# Patient Record
Sex: FEMALE | Race: WHITE | NOT HISPANIC OR LATINO | Employment: OTHER
[De-identification: names, ages, dates, MRNs, and addresses within clinical notes are randomized per-mention and may not be internally consistent; named-entity substitution may affect disease eponyms.]

---

## 2017-02-27 ENCOUNTER — RECORDS - HEALTHEAST (OUTPATIENT)
Dept: ADMINISTRATIVE | Facility: OTHER | Age: 57
End: 2017-02-27

## 2017-03-06 ENCOUNTER — COMMUNICATION - HEALTHEAST (OUTPATIENT)
Dept: FAMILY MEDICINE | Facility: CLINIC | Age: 57
End: 2017-03-06

## 2017-04-07 ENCOUNTER — OFFICE VISIT - HEALTHEAST (OUTPATIENT)
Dept: FAMILY MEDICINE | Facility: CLINIC | Age: 57
End: 2017-04-07

## 2017-04-07 ENCOUNTER — COMMUNICATION - HEALTHEAST (OUTPATIENT)
Dept: SCHEDULING | Facility: CLINIC | Age: 57
End: 2017-04-07

## 2017-04-07 DIAGNOSIS — R30.0 DYSURIA: ICD-10-CM

## 2017-04-07 DIAGNOSIS — Z00.00 ROUTINE GENERAL MEDICAL EXAMINATION AT A HEALTH CARE FACILITY: ICD-10-CM

## 2017-04-07 DIAGNOSIS — Z12.11 COLON CANCER SCREENING: ICD-10-CM

## 2017-04-07 DIAGNOSIS — Z12.39 BREAST CANCER SCREENING: ICD-10-CM

## 2017-04-07 DIAGNOSIS — F20.9 CHRONIC SCHIZOPHRENIA (H): ICD-10-CM

## 2017-04-07 LAB
CHOLEST SERPL-MCNC: 190 MG/DL
FASTING STATUS PATIENT QL REPORTED: YES
HBA1C MFR BLD: 5.8 % (ref 3.5–6)
HDLC SERPL-MCNC: 70 MG/DL
LDLC SERPL CALC-MCNC: 107 MG/DL
TRIGL SERPL-MCNC: 63 MG/DL

## 2017-04-07 ASSESSMENT — MIFFLIN-ST. JEOR: SCORE: 1268.64

## 2017-04-10 ENCOUNTER — COMMUNICATION - HEALTHEAST (OUTPATIENT)
Dept: FAMILY MEDICINE | Facility: CLINIC | Age: 57
End: 2017-04-10

## 2017-04-10 DIAGNOSIS — F20.9 CHRONIC SCHIZOPHRENIA (H): ICD-10-CM

## 2017-04-14 LAB
HPV INTERPRETATION - HISTORICAL: NORMAL
HPV INTERPRETER - HISTORICAL: NORMAL

## 2017-04-17 ENCOUNTER — COMMUNICATION - HEALTHEAST (OUTPATIENT)
Dept: FAMILY MEDICINE | Facility: CLINIC | Age: 57
End: 2017-04-17

## 2017-04-17 LAB
BKR LAB AP ABNORMAL BLEEDING: NO
BKR LAB AP BIRTH CONTROL/HORMONES: ABNORMAL
BKR LAB AP CERVICAL APPEARANCE: NORMAL
BKR LAB AP GYN ADEQUACY: ABNORMAL
BKR LAB AP GYN INTERPRETATION: ABNORMAL
BKR LAB AP HPV REFLEX: ABNORMAL
BKR LAB AP LMP: ABNORMAL
BKR LAB AP PATIENT STATUS: ABNORMAL
BKR LAB AP PREVIOUS ABNORMAL: ABNORMAL
BKR LAB AP PREVIOUS NORMAL: ABNORMAL
HIGH RISK?: YES
PATH REPORT.COMMENTS IMP SPEC: ABNORMAL
RESULT FLAG (HE HISTORICAL CONVERSION): ABNORMAL

## 2017-08-17 ENCOUNTER — COMMUNICATION - HEALTHEAST (OUTPATIENT)
Dept: FAMILY MEDICINE | Facility: CLINIC | Age: 57
End: 2017-08-17

## 2017-08-18 ENCOUNTER — COMMUNICATION - HEALTHEAST (OUTPATIENT)
Dept: FAMILY MEDICINE | Facility: CLINIC | Age: 57
End: 2017-08-18

## 2017-08-28 ENCOUNTER — AMBULATORY - HEALTHEAST (OUTPATIENT)
Dept: FAMILY MEDICINE | Facility: CLINIC | Age: 57
End: 2017-08-28

## 2017-08-28 DIAGNOSIS — M54.50 LOW BACK PAIN: ICD-10-CM

## 2017-08-28 DIAGNOSIS — M54.2 CERVICALGIA: ICD-10-CM

## 2017-11-16 ENCOUNTER — COMMUNICATION - HEALTHEAST (OUTPATIENT)
Dept: FAMILY MEDICINE | Facility: CLINIC | Age: 57
End: 2017-11-16

## 2017-11-17 ENCOUNTER — OFFICE VISIT - HEALTHEAST (OUTPATIENT)
Dept: FAMILY MEDICINE | Facility: CLINIC | Age: 57
End: 2017-11-17

## 2017-11-17 DIAGNOSIS — R35.0 URINARY FREQUENCY: ICD-10-CM

## 2017-11-17 ASSESSMENT — MIFFLIN-ST. JEOR: SCORE: 1268.64

## 2017-11-20 ENCOUNTER — COMMUNICATION - HEALTHEAST (OUTPATIENT)
Dept: FAMILY MEDICINE | Facility: CLINIC | Age: 57
End: 2017-11-20

## 2018-02-23 ENCOUNTER — OFFICE VISIT - HEALTHEAST (OUTPATIENT)
Dept: FAMILY MEDICINE | Facility: CLINIC | Age: 58
End: 2018-02-23

## 2018-02-23 DIAGNOSIS — R35.0 URINE FREQUENCY: ICD-10-CM

## 2018-02-23 LAB
ALBUMIN UR-MCNC: NEGATIVE MG/DL
APPEARANCE UR: CLEAR
BILIRUB UR QL STRIP: NEGATIVE
COLOR UR AUTO: YELLOW
GLUCOSE UR STRIP-MCNC: NEGATIVE MG/DL
HGB UR QL STRIP: NEGATIVE
KETONES UR STRIP-MCNC: NEGATIVE MG/DL
LEUKOCYTE ESTERASE UR QL STRIP: NEGATIVE
NITRATE UR QL: NEGATIVE
PH UR STRIP: 7 [PH] (ref 5–8)
SP GR UR STRIP: 1.01 (ref 1–1.03)
UROBILINOGEN UR STRIP-ACNC: NORMAL

## 2018-02-24 LAB — BACTERIA SPEC CULT: NO GROWTH

## 2018-02-26 ENCOUNTER — COMMUNICATION - HEALTHEAST (OUTPATIENT)
Dept: FAMILY MEDICINE | Facility: CLINIC | Age: 58
End: 2018-02-26

## 2018-06-06 ENCOUNTER — OFFICE VISIT - HEALTHEAST (OUTPATIENT)
Dept: FAMILY MEDICINE | Facility: CLINIC | Age: 58
End: 2018-06-06

## 2018-06-06 DIAGNOSIS — Z00.00 ROUTINE GENERAL MEDICAL EXAMINATION AT A HEALTH CARE FACILITY: ICD-10-CM

## 2018-06-06 DIAGNOSIS — F20.9 CHRONIC SCHIZOPHRENIA (H): ICD-10-CM

## 2018-06-06 DIAGNOSIS — Z83.3 FAMILY HISTORY OF DIABETES MELLITUS: ICD-10-CM

## 2018-06-06 DIAGNOSIS — M54.2 CERVICALGIA: ICD-10-CM

## 2018-06-06 DIAGNOSIS — M20.002 DEFORMITY OF LEFT THUMB JOINT: ICD-10-CM

## 2018-06-06 DIAGNOSIS — Z12.11 SCREEN FOR COLON CANCER: ICD-10-CM

## 2018-06-06 DIAGNOSIS — Z12.31 VISIT FOR SCREENING MAMMOGRAM: ICD-10-CM

## 2018-06-06 DIAGNOSIS — R87.610 ATYPICAL SQUAMOUS CELLS OF UNDETERMINED SIGNIFICANCE ON CYTOLOGIC SMEAR OF CERVIX (ASC-US): ICD-10-CM

## 2018-06-06 LAB
ALBUMIN SERPL-MCNC: 3.9 G/DL (ref 3.5–5)
ALP SERPL-CCNC: 77 U/L (ref 45–120)
ALT SERPL W P-5'-P-CCNC: 14 U/L (ref 0–45)
ANION GAP SERPL CALCULATED.3IONS-SCNC: 12 MMOL/L (ref 5–18)
AST SERPL W P-5'-P-CCNC: 20 U/L (ref 0–40)
BASOPHILS # BLD AUTO: 0 THOU/UL (ref 0–0.2)
BASOPHILS NFR BLD AUTO: 1 % (ref 0–2)
BILIRUB SERPL-MCNC: 0.6 MG/DL (ref 0–1)
BUN SERPL-MCNC: 14 MG/DL (ref 8–22)
CALCIUM SERPL-MCNC: 9.4 MG/DL (ref 8.5–10.5)
CHLORIDE BLD-SCNC: 103 MMOL/L (ref 98–107)
CHOLEST SERPL-MCNC: 200 MG/DL
CLUE CELLS: NORMAL
CO2 SERPL-SCNC: 26 MMOL/L (ref 22–31)
CREAT SERPL-MCNC: 0.7 MG/DL (ref 0.6–1.1)
EOSINOPHIL # BLD AUTO: 0.3 THOU/UL (ref 0–0.4)
EOSINOPHIL NFR BLD AUTO: 6 % (ref 0–6)
ERYTHROCYTE [DISTWIDTH] IN BLOOD BY AUTOMATED COUNT: 11.9 % (ref 11–14.5)
FASTING STATUS PATIENT QL REPORTED: ABNORMAL
GFR SERPL CREATININE-BSD FRML MDRD: >60 ML/MIN/1.73M2
GLUCOSE BLD-MCNC: 88 MG/DL (ref 70–125)
HCT VFR BLD AUTO: 35.9 % (ref 35–47)
HDLC SERPL-MCNC: 77 MG/DL
HGB BLD-MCNC: 12 G/DL (ref 12–16)
LDLC SERPL CALC-MCNC: 109 MG/DL
LYMPHOCYTES # BLD AUTO: 1.6 THOU/UL (ref 0.8–4.4)
LYMPHOCYTES NFR BLD AUTO: 29 % (ref 20–40)
MCH RBC QN AUTO: 31.4 PG (ref 27–34)
MCHC RBC AUTO-ENTMCNC: 33.5 G/DL (ref 32–36)
MCV RBC AUTO: 94 FL (ref 80–100)
MONOCYTES # BLD AUTO: 0.4 THOU/UL (ref 0–0.9)
MONOCYTES NFR BLD AUTO: 7 % (ref 2–10)
NEUTROPHILS # BLD AUTO: 3.3 THOU/UL (ref 2–7.7)
NEUTROPHILS NFR BLD AUTO: 59 % (ref 50–70)
PLATELET # BLD AUTO: 294 THOU/UL (ref 140–440)
PMV BLD AUTO: 7.4 FL (ref 7–10)
POTASSIUM BLD-SCNC: 4.5 MMOL/L (ref 3.5–5)
PROT SERPL-MCNC: 7.1 G/DL (ref 6–8)
RBC # BLD AUTO: 3.82 MILL/UL (ref 3.8–5.4)
SODIUM SERPL-SCNC: 141 MMOL/L (ref 136–145)
TRICHOMONAS, WET PREP: NORMAL
TRIGL SERPL-MCNC: 70 MG/DL
TSH SERPL DL<=0.005 MIU/L-ACNC: 1.96 UIU/ML (ref 0.3–5)
VALPROATE SERPL-MCNC: 43.5 UG/ML (ref 50–150)
WBC: 5.6 THOU/UL (ref 4–11)
YEAST, WET PREP: NORMAL

## 2018-06-06 ASSESSMENT — MIFFLIN-ST. JEOR: SCORE: 1260.14

## 2018-06-07 ENCOUNTER — RECORDS - HEALTHEAST (OUTPATIENT)
Dept: MAMMOGRAPHY | Facility: CLINIC | Age: 58
End: 2018-06-07

## 2018-06-07 DIAGNOSIS — Z12.31 ENCOUNTER FOR SCREENING MAMMOGRAM FOR MALIGNANT NEOPLASM OF BREAST: ICD-10-CM

## 2018-06-07 LAB
HPV SOURCE: NORMAL
HUMAN PAPILLOMA VIRUS 16 DNA: NEGATIVE
HUMAN PAPILLOMA VIRUS 18 DNA: NEGATIVE
HUMAN PAPILLOMA VIRUS FINAL DIAGNOSIS: NORMAL
HUMAN PAPILLOMA VIRUS OTHER HR: NEGATIVE
SPECIMEN DESCRIPTION: NORMAL

## 2018-06-08 ENCOUNTER — COMMUNICATION - HEALTHEAST (OUTPATIENT)
Dept: FAMILY MEDICINE | Facility: CLINIC | Age: 58
End: 2018-06-08

## 2018-06-11 ENCOUNTER — COMMUNICATION - HEALTHEAST (OUTPATIENT)
Dept: FAMILY MEDICINE | Facility: CLINIC | Age: 58
End: 2018-06-11

## 2018-06-18 ENCOUNTER — COMMUNICATION - HEALTHEAST (OUTPATIENT)
Dept: FAMILY MEDICINE | Facility: CLINIC | Age: 58
End: 2018-06-18

## 2018-06-18 LAB

## 2019-06-10 ENCOUNTER — COMMUNICATION - HEALTHEAST (OUTPATIENT)
Dept: TELEHEALTH | Facility: CLINIC | Age: 59
End: 2019-06-10

## 2019-06-10 ENCOUNTER — OFFICE VISIT - HEALTHEAST (OUTPATIENT)
Dept: FAMILY MEDICINE | Facility: CLINIC | Age: 59
End: 2019-06-10

## 2019-06-10 DIAGNOSIS — F20.9 CHRONIC SCHIZOPHRENIA (H): ICD-10-CM

## 2019-06-10 DIAGNOSIS — Z12.11 COLON CANCER SCREENING: ICD-10-CM

## 2019-06-10 DIAGNOSIS — Z00.00 ROUTINE GENERAL MEDICAL EXAMINATION AT A HEALTH CARE FACILITY: ICD-10-CM

## 2019-06-10 DIAGNOSIS — Z20.5 EXPOSURE TO HEPATITIS: ICD-10-CM

## 2019-06-10 DIAGNOSIS — R25.9 ABNORMAL INVOLUNTARY MOVEMENTS: ICD-10-CM

## 2019-06-10 DIAGNOSIS — Z79.899 HIGH RISK MEDICATIONS (NOT ANTICOAGULANTS) LONG-TERM USE: ICD-10-CM

## 2019-06-10 LAB
ALBUMIN SERPL-MCNC: 4 G/DL (ref 3.5–5)
ALP SERPL-CCNC: 77 U/L (ref 45–120)
ALT SERPL W P-5'-P-CCNC: 17 U/L (ref 0–45)
ANION GAP SERPL CALCULATED.3IONS-SCNC: 11 MMOL/L (ref 5–18)
AST SERPL W P-5'-P-CCNC: 20 U/L (ref 0–40)
BASOPHILS # BLD AUTO: 0 THOU/UL (ref 0–0.2)
BASOPHILS NFR BLD AUTO: 1 % (ref 0–2)
BILIRUB SERPL-MCNC: 0.5 MG/DL (ref 0–1)
BUN SERPL-MCNC: 14 MG/DL (ref 8–22)
CALCIUM SERPL-MCNC: 9.6 MG/DL (ref 8.5–10.5)
CHLORIDE BLD-SCNC: 108 MMOL/L (ref 98–107)
CHOLEST SERPL-MCNC: 203 MG/DL
CO2 SERPL-SCNC: 23 MMOL/L (ref 22–31)
CREAT SERPL-MCNC: 0.7 MG/DL (ref 0.6–1.1)
EOSINOPHIL # BLD AUTO: 0.4 THOU/UL (ref 0–0.4)
EOSINOPHIL NFR BLD AUTO: 8 % (ref 0–6)
ERYTHROCYTE [DISTWIDTH] IN BLOOD BY AUTOMATED COUNT: 12 % (ref 11–14.5)
FASTING STATUS PATIENT QL REPORTED: YES
GFR SERPL CREATININE-BSD FRML MDRD: >60 ML/MIN/1.73M2
GLUCOSE BLD-MCNC: 89 MG/DL (ref 70–125)
HCT VFR BLD AUTO: 38.4 % (ref 35–47)
HDLC SERPL-MCNC: 77 MG/DL
HGB BLD-MCNC: 13 G/DL (ref 12–16)
LDLC SERPL CALC-MCNC: 113 MG/DL
LYMPHOCYTES # BLD AUTO: 1.6 THOU/UL (ref 0.8–4.4)
LYMPHOCYTES NFR BLD AUTO: 32 % (ref 20–40)
MCH RBC QN AUTO: 31.6 PG (ref 27–34)
MCHC RBC AUTO-ENTMCNC: 33.9 G/DL (ref 32–36)
MCV RBC AUTO: 93 FL (ref 80–100)
MONOCYTES # BLD AUTO: 0.4 THOU/UL (ref 0–0.9)
MONOCYTES NFR BLD AUTO: 8 % (ref 2–10)
NEUTROPHILS # BLD AUTO: 2.5 THOU/UL (ref 2–7.7)
NEUTROPHILS NFR BLD AUTO: 51 % (ref 50–70)
PLATELET # BLD AUTO: 334 THOU/UL (ref 140–440)
PMV BLD AUTO: 7.3 FL (ref 7–10)
POTASSIUM BLD-SCNC: 4.2 MMOL/L (ref 3.5–5)
PROT SERPL-MCNC: 7.1 G/DL (ref 6–8)
RBC # BLD AUTO: 4.12 MILL/UL (ref 3.8–5.4)
SODIUM SERPL-SCNC: 142 MMOL/L (ref 136–145)
TRIGL SERPL-MCNC: 63 MG/DL
WBC: 4.9 THOU/UL (ref 4–11)

## 2019-06-10 ASSESSMENT — MIFFLIN-ST. JEOR: SCORE: 1252.21

## 2019-06-11 LAB
25(OH)D3 SERPL-MCNC: 31.7 NG/ML (ref 30–80)
HBV SURFACE AB SERPL IA-ACNC: NEGATIVE M[IU]/ML

## 2019-06-12 ENCOUNTER — COMMUNICATION - HEALTHEAST (OUTPATIENT)
Dept: FAMILY MEDICINE | Facility: CLINIC | Age: 59
End: 2019-06-12

## 2019-06-12 LAB
HAV IGM SERPL QL IA: NEGATIVE
HBV CORE IGM SERPL QL IA: NEGATIVE
HBV SURFACE AG SERPL QL IA: NEGATIVE
HCV AB SERPL QL IA: NEGATIVE

## 2019-07-17 ENCOUNTER — OFFICE VISIT - HEALTHEAST (OUTPATIENT)
Dept: FAMILY MEDICINE | Facility: CLINIC | Age: 59
End: 2019-07-17

## 2019-07-17 DIAGNOSIS — M20.002 DEFORMITY OF LEFT THUMB JOINT: ICD-10-CM

## 2019-07-17 DIAGNOSIS — R35.0 URINARY FREQUENCY: ICD-10-CM

## 2019-07-17 LAB
ALBUMIN UR-MCNC: NEGATIVE MG/DL
APPEARANCE UR: CLEAR
BILIRUB UR QL STRIP: NEGATIVE
COLOR UR AUTO: YELLOW
GLUCOSE UR STRIP-MCNC: NEGATIVE MG/DL
HGB UR QL STRIP: NEGATIVE
KETONES UR STRIP-MCNC: ABNORMAL MG/DL
LEUKOCYTE ESTERASE UR QL STRIP: NEGATIVE
NITRATE UR QL: NEGATIVE
PH UR STRIP: 6.5 [PH] (ref 5–8)
SP GR UR STRIP: 1.02 (ref 1–1.03)
UROBILINOGEN UR STRIP-ACNC: ABNORMAL

## 2019-07-18 LAB — BACTERIA SPEC CULT: NO GROWTH

## 2019-07-22 ENCOUNTER — OFFICE VISIT - HEALTHEAST (OUTPATIENT)
Dept: FAMILY MEDICINE | Facility: CLINIC | Age: 59
End: 2019-07-22

## 2019-07-22 ENCOUNTER — COMMUNICATION - HEALTHEAST (OUTPATIENT)
Dept: FAMILY MEDICINE | Facility: CLINIC | Age: 59
End: 2019-07-22

## 2019-07-22 DIAGNOSIS — N89.8 VAGINAL DISCHARGE: ICD-10-CM

## 2019-07-22 DIAGNOSIS — A63.0 GENITAL WARTS: ICD-10-CM

## 2019-07-22 LAB
CLUE CELLS: NORMAL
TRICHOMONAS, WET PREP: NORMAL
YEAST, WET PREP: NORMAL

## 2019-07-22 ASSESSMENT — MIFFLIN-ST. JEOR: SCORE: 1276.25

## 2019-10-09 ENCOUNTER — COMMUNICATION - HEALTHEAST (OUTPATIENT)
Dept: SCHEDULING | Facility: CLINIC | Age: 59
End: 2019-10-09

## 2019-10-09 DIAGNOSIS — R39.9 UTI SYMPTOMS: ICD-10-CM

## 2020-06-11 ENCOUNTER — COMMUNICATION - HEALTHEAST (OUTPATIENT)
Dept: FAMILY MEDICINE | Facility: CLINIC | Age: 60
End: 2020-06-11

## 2020-06-11 DIAGNOSIS — Z00.00 HEALTH CARE MAINTENANCE: ICD-10-CM

## 2020-07-07 ENCOUNTER — AMBULATORY - HEALTHEAST (OUTPATIENT)
Dept: FAMILY MEDICINE | Facility: CLINIC | Age: 60
End: 2020-07-07

## 2020-07-15 ENCOUNTER — OFFICE VISIT - HEALTHEAST (OUTPATIENT)
Dept: FAMILY MEDICINE | Facility: CLINIC | Age: 60
End: 2020-07-15

## 2020-07-15 DIAGNOSIS — Z00.00 ROUTINE GENERAL MEDICAL EXAMINATION AT A HEALTH CARE FACILITY: ICD-10-CM

## 2020-07-15 DIAGNOSIS — Z11.4 SCREENING FOR HUMAN IMMUNODEFICIENCY VIRUS WITHOUT PRESENCE OF RISK FACTORS: ICD-10-CM

## 2020-07-15 DIAGNOSIS — R73.03 PREDIABETES: ICD-10-CM

## 2020-07-15 DIAGNOSIS — L30.9 DERMATITIS: ICD-10-CM

## 2020-07-15 DIAGNOSIS — Z12.11 COLON CANCER SCREENING: ICD-10-CM

## 2020-07-15 DIAGNOSIS — Z12.31 ENCOUNTER FOR SCREENING MAMMOGRAM FOR BREAST CANCER: ICD-10-CM

## 2020-07-15 DIAGNOSIS — F31.89 ATYPICAL BIPOLAR AFFECTIVE DISORDER (H): ICD-10-CM

## 2020-07-15 LAB
HBA1C MFR BLD: 5.5 % (ref 3.5–6)
HIV 1+2 AB+HIV1 P24 AG SERPL QL IA: NEGATIVE

## 2020-07-15 ASSESSMENT — MIFFLIN-ST. JEOR: SCORE: 1266.95

## 2020-07-16 ENCOUNTER — RECORDS - HEALTHEAST (OUTPATIENT)
Dept: ADMINISTRATIVE | Facility: OTHER | Age: 60
End: 2020-07-16

## 2020-07-20 ENCOUNTER — COMMUNICATION - HEALTHEAST (OUTPATIENT)
Dept: FAMILY MEDICINE | Facility: CLINIC | Age: 60
End: 2020-07-20

## 2020-09-16 ENCOUNTER — COMMUNICATION - HEALTHEAST (OUTPATIENT)
Dept: FAMILY MEDICINE | Facility: CLINIC | Age: 60
End: 2020-09-16

## 2021-04-09 ENCOUNTER — COMMUNICATION - HEALTHEAST (OUTPATIENT)
Dept: SCHEDULING | Facility: CLINIC | Age: 61
End: 2021-04-09

## 2021-04-09 ENCOUNTER — NURSE TRIAGE (OUTPATIENT)
Dept: NURSING | Facility: CLINIC | Age: 61
End: 2021-04-09

## 2021-04-09 ENCOUNTER — OFFICE VISIT (OUTPATIENT)
Dept: URGENT CARE | Facility: URGENT CARE | Age: 61
End: 2021-04-09
Payer: MEDICAID

## 2021-04-09 VITALS
TEMPERATURE: 97.9 F | OXYGEN SATURATION: 97 % | BODY MASS INDEX: 31.01 KG/M2 | WEIGHT: 175 LBS | RESPIRATION RATE: 16 BRPM | HEART RATE: 75 BPM | SYSTOLIC BLOOD PRESSURE: 120 MMHG | HEIGHT: 63 IN | DIASTOLIC BLOOD PRESSURE: 76 MMHG

## 2021-04-09 DIAGNOSIS — R35.0 URINARY FREQUENCY: Primary | ICD-10-CM

## 2021-04-09 LAB
ALBUMIN UR-MCNC: NEGATIVE MG/DL
APPEARANCE UR: CLEAR
BACTERIA #/AREA URNS HPF: ABNORMAL /HPF
BILIRUB UR QL STRIP: NEGATIVE
COLOR UR AUTO: YELLOW
GLUCOSE UR STRIP-MCNC: NEGATIVE MG/DL
HGB UR QL STRIP: ABNORMAL
KETONES UR STRIP-MCNC: NEGATIVE MG/DL
LEUKOCYTE ESTERASE UR QL STRIP: NEGATIVE
NITRATE UR QL: NEGATIVE
NON-SQ EPI CELLS #/AREA URNS LPF: ABNORMAL /LPF
PH UR STRIP: 5.5 PH (ref 5–7)
RBC #/AREA URNS AUTO: ABNORMAL /HPF
SOURCE: ABNORMAL
SP GR UR STRIP: 1.02 (ref 1–1.03)
SPECIMEN SOURCE: NORMAL
UROBILINOGEN UR STRIP-ACNC: 0.2 EU/DL (ref 0.2–1)
WBC #/AREA URNS AUTO: ABNORMAL /HPF
WET PREP SPEC: NORMAL

## 2021-04-09 PROCEDURE — 87210 SMEAR WET MOUNT SALINE/INK: CPT | Performed by: NURSE PRACTITIONER

## 2021-04-09 PROCEDURE — 81001 URINALYSIS AUTO W/SCOPE: CPT | Performed by: PHYSICIAN ASSISTANT

## 2021-04-09 PROCEDURE — 99203 OFFICE O/P NEW LOW 30 MIN: CPT | Performed by: NURSE PRACTITIONER

## 2021-04-09 RX ORDER — RISPERIDONE 3 MG/1
TABLET ORAL
COMMUNITY
Start: 2021-03-29 | End: 2022-08-17

## 2021-04-09 RX ORDER — BENZTROPINE MESYLATE 1 MG/1
TABLET ORAL
COMMUNITY
Start: 2020-09-09 | End: 2022-10-21

## 2021-04-09 RX ORDER — ZOLPIDEM TARTRATE 5 MG/1
TABLET ORAL
COMMUNITY
Start: 2021-03-17 | End: 2022-08-17

## 2021-04-09 ASSESSMENT — MIFFLIN-ST. JEOR: SCORE: 1342.92

## 2021-04-09 NOTE — PATIENT INSTRUCTIONS
Drink extra water and is symptoms persist please see your primary care provider.    No scented products to be used around the vagina or in underwear.    Do not soak in bathtub if there is any soap in the tub.

## 2021-04-09 NOTE — PROGRESS NOTES
Chief Complaint   Patient presents with     Urgent Care     Urinary Problem     bad urinary frequency x 2 days, denies pain.          ICD-10-CM    1. Urinary frequency  R35.0 *UA reflex to Microscopic and Culture ([facility] (except [place] and [place])     Urine Microscopic     Wet prep     No obvious reason for patient's symptoms at this time.  We will culture urine as a double check for UTI but with no white blood cells this is unlikely.  Encouraged patient to drink extra water and follow-up in 1 week if symptoms still persist with her primary care provider.  If she develops fever, back pain, nausea or vomiting she will go to the emergency room.    48 minutes total time spent with patient reviewing chart, explaining potential diagnosis and plan of care completed exam and history and documentation.    Medical Decision Making    Differential Diagnosis:  UTI: UTI, Dysuria, Pyelonephritis, Kidney Stone and Vaginitis      Results for orders placed or performed in visit on 04/09/21 (from the past 24 hour(s))   *UA reflex to Microscopic and Culture ([place] and [facility] (except [place] and [place])    Specimen: Midstream Urine   Result Value Ref Range    Color Urine Yellow     Appearance Urine Clear     Glucose Urine Negative NEG^Negative mg/dL    Bilirubin Urine Negative NEG^Negative    Ketones Urine Negative NEG^Negative mg/dL    Specific Gravity Urine 1.025 1.003 - 1.035    Blood Urine Small (A) NEG^Negative    pH Urine 5.5 5.0 - 7.0 pH    Protein Albumin Urine Negative NEG^Negative mg/dL    Urobilinogen Urine 0.2 0.2 - 1.0 EU/dL    Nitrite Urine Negative NEG^Negative    Leukocyte Esterase Urine Negative NEG^Negative    Source Midstream Urine    Urine Microscopic   Result Value Ref Range    WBC Urine 0 - 5 OTO5^0 - 5 /HPF    RBC Urine 5-10 (A) OTO2^O - 2 /HPF    Squamous Epithelial /LPF Urine Few FEW^Few /LPF    Bacteria Urine Few (A) NEG^Negative /HPF       Subjective     Susi QUEZADA  "Tahir is an 60 year old female who presents to clinic today for urinary frequency for several days.  She denies dysuria, concerns about STDs, back pain, nausea, vomiting, chills, fever. She I s part of an assist at home program where she lives in her own apartment but mental health staff come and visit her and help with her medication and coordination of care. She has a staff present with her today. She is not sexually active.      Objective    /76   Pulse 75   Temp 97.9  F (36.6  C) (Oral)   Resp 16   Ht 1.6 m (5' 3\")   Wt 79.4 kg (175 lb)   SpO2 97%   BMI 31.00 kg/m      Physical Exam       GENERAL APPEARANCE: healthy appearing, alert     ABDOMEN:  soft, nontender, no HSM or masses and bowel sounds normal, no CVA tenderness  : Labia normal, vagina is atrophied, no discharge present, unable to complete speculum exam, urethra appears normal with no erythema or swelling     SKIN: no suspicious lesions or rashes     PSYCH: normal thought process; no significant mood disturbance    Patient Instructions   Drink extra water and is symptoms persist please see your primary care provider.    No scented products to be used around the vagina or in underwear.    Do not soak in bathtub if there is any soap in the tub.      HUONG Sandoval, CNP  [facility] Provider      "

## 2021-04-13 ENCOUNTER — NURSE TRIAGE (OUTPATIENT)
Dept: NURSING | Facility: CLINIC | Age: 61
End: 2021-04-13

## 2021-04-13 ENCOUNTER — OFFICE VISIT (OUTPATIENT)
Dept: URGENT CARE | Facility: URGENT CARE | Age: 61
End: 2021-04-13
Payer: MEDICAID

## 2021-04-13 VITALS
SYSTOLIC BLOOD PRESSURE: 124 MMHG | WEIGHT: 175 LBS | HEART RATE: 75 BPM | DIASTOLIC BLOOD PRESSURE: 78 MMHG | BODY MASS INDEX: 31 KG/M2 | OXYGEN SATURATION: 98 %

## 2021-04-13 DIAGNOSIS — Z00.00 LABORATORY EXAMINATION ORDERED AS PART OF A ROUTINE GENERAL MEDICAL EXAMINATION: Primary | ICD-10-CM

## 2021-04-13 PROBLEM — M20.002: Status: ACTIVE | Noted: 2019-07-17

## 2021-04-13 PROBLEM — R87.610 ATYPICAL SQUAMOUS CELLS OF UNDETERMINED SIGNIFICANCE ON CYTOLOGIC SMEAR OF CERVIX (ASC-US): Status: ACTIVE | Noted: 2018-06-06

## 2021-04-13 PROBLEM — M54.50 LOW BACK PAIN: Status: ACTIVE | Noted: 2017-08-28

## 2021-04-13 PROBLEM — F20.9 CHRONIC SCHIZOPHRENIA (H): Status: ACTIVE | Noted: 2017-04-07

## 2021-04-13 LAB — HBA1C MFR BLD: 5.6 % (ref 0–5.6)

## 2021-04-13 PROCEDURE — 99212 OFFICE O/P EST SF 10 MIN: CPT | Performed by: NURSE PRACTITIONER

## 2021-04-13 PROCEDURE — 83036 HEMOGLOBIN GLYCOSYLATED A1C: CPT | Performed by: NURSE PRACTITIONER

## 2021-04-13 PROCEDURE — 36415 COLL VENOUS BLD VENIPUNCTURE: CPT | Performed by: NURSE PRACTITIONER

## 2021-04-13 ASSESSMENT — ENCOUNTER SYMPTOMS
POLYDIPSIA: 0
PARESTHESIAS: 0
APPETITE CHANGE: 0
DIARRHEA: 0
FEVER: 0
CHILLS: 0
HEADACHES: 0
WEAKNESS: 0
FREQUENCY: 0
VOMITING: 0
DYSURIA: 0
COLOR CHANGE: 0
SLEEP DISTURBANCE: 0
LIGHT-HEADEDNESS: 0
NUMBNESS: 0
ACTIVITY CHANGE: 0
POLYPHAGIA: 0
ABDOMINAL PAIN: 0
DIAPHORESIS: 0
DIZZINESS: 0
NAUSEA: 0
TREMORS: 0
FLANK PAIN: 0
FATIGUE: 0

## 2021-04-13 NOTE — PROGRESS NOTES
Chief Complaint   Patient presents with     Urgent Care     Blood Draw     SUBJECTIVE:  Susi Haro is a 60 year old female who presents today requesting an A1C blood draw. She says she is just here today to rule out diabetes as there is a family history and she would like only an A1C drawn. She says she doesn't have a primary care provider. She denies any symptoms or concerns and says she is much better than her last clinic visit on 04/09. Denies dysuria, confusion, increased thirst, hunger, urination, upset stomach, dizziness, etc.    *The patient has bipolar schizophrenia and was by herself in the clinic room. I received a phone call from the group home RN after the visit stating she was upset why only an A1C was drawn and why the patient's group home helper was not allowed into the clinic. She requested to make a complaint against our clinic as she had a full workup written and wanted a CBC as well as her helper to come into the room. I was not made aware of this situation and thought the patient was by herself. The patient did not relay any of this information to me. I discussed with our  staff who assured me that they did not and would not turn away any helpers. It is quite standard across our urgent care clinics that we allow patient's to have helpers and [facility] no longer has a COVID-19 guest restriction. I apologized to the group home RN and relayed that I would speak to my staff, boss and she can contact patient relations for any further discussion.    No past medical history on file.  Current Outpatient Medications   Medication Sig Dispense Refill     benztropine (COGENTIN) 1 MG tablet        metFORMIN (GLUCOPHAGE) 500 MG tablet        risperiDONE (RISPERDAL) 3 MG tablet        zolpidem (AMBIEN) 5 MG tablet        Social History     Tobacco Use     Smoking status: Never Smoker     Smokeless tobacco: Never Used   Substance Use Topics     Alcohol use: Not on file     Allergies   Allergen  Reactions     Lactase      Melon      Trazodone Other (See Comments)     heart burn  heart burn, PN: heart burn       Review of Systems   Constitutional: Negative for activity change, appetite change, chills, diaphoresis, fatigue and fever.   Gastrointestinal: Negative for abdominal pain, diarrhea, nausea and vomiting.   Endocrine: Negative for polydipsia, polyphagia and polyuria.   Genitourinary: Negative for decreased urine volume, dysuria, flank pain, frequency and urgency.   Skin: Negative for color change and pallor.   Neurological: Negative for dizziness, tremors, weakness, light-headedness, numbness, headaches and paresthesias.   Psychiatric/Behavioral: Negative for mood changes and sleep disturbance.        Bipolar schizophrenia per chart review.     OBJECTIVE:  /78   Pulse 75   Wt 79.4 kg (175 lb)   SpO2 98%   BMI 31.00 kg/m       Physical Exam  Vitals signs reviewed.   Constitutional:       General: She is not in acute distress.     Appearance: She is well-developed. She is not ill-appearing, toxic-appearing or diaphoretic.      Comments: Patient is pleasant. Slow word finding speech.   HENT:      Head: Normocephalic and atraumatic.   Pulmonary:      Effort: Pulmonary effort is normal.   Abdominal:      General: Abdomen is flat.      Palpations: Abdomen is soft.   Musculoskeletal: Normal range of motion.   Skin:     General: Skin is warm and dry.      Coloration: Skin is not pale.   Neurological:      Mental Status: She is alert.      Motor: No weakness.      Coordination: Coordination normal.      Gait: Gait normal.      Comments: Bipolar schizophrenia.       Results for orders placed or performed in visit on 04/13/21   Hemoglobin A1c     Status: None   Result Value Ref Range    Hemoglobin A1C 5.6 0 - 5.6 %     ASSESSMENT:    ICD-10-CM    1. Laboratory examination ordered as part of a routine general medical examination  Z00.00 Hemoglobin A1c     PLAN:   Patient Instructions   A1C ordered per  request of patient  We will call if abnormal in the next day  Follow-up with primary care provider for any questions or concerns    Follow up with primary care provider with any problems, questions or concerns or if symptoms worsen or fail to improve. Patient agreed to plan and verbalized understanding.    Mary Beth Mora, [facility]

## 2021-04-13 NOTE — TELEPHONE ENCOUNTER
Triage Call:    -INGE Andrade calling from [facility] regarding patient who she advised be seen at [facility] today.   -INGE Andrade sent patient over due to patient increased confusion, disorganization, concerned about possible infection and per RN an A1C was the only thing checked at [facility].   -RN would like to speak directly to [facility] provider who patient saw today PHILIP Mora  -RN called over to [facility] to get directed to provider who saw patient today.   -Staff Member at [facility] RN/writer spoke with and connected provider at [facility] to INGE Andrade.  -Provider and Lupe RN took over call. RN/writer disconnected at that time.       Carolina Lowery RN, BSN Nurse Triage Advisor 4:10 PM 4/13/2021       Additional Information    Health Information question, no triage required and triager able to answer question    Protocols used: INFORMATION ONLY CALL-A-

## 2021-04-13 NOTE — PATIENT INSTRUCTIONS
A1C ordered per request of patient  We will call if abnormal in the next day  Follow-up with primary care provider for any questions or concerns

## 2021-04-14 ENCOUNTER — OFFICE VISIT - HEALTHEAST (OUTPATIENT)
Dept: FAMILY MEDICINE | Facility: CLINIC | Age: 61
End: 2021-04-14

## 2021-04-14 DIAGNOSIS — R68.89 DECREASED FUNCTIONAL ACTIVITY TOLERANCE: ICD-10-CM

## 2021-04-14 LAB
ALBUMIN SERPL-MCNC: 4.4 G/DL (ref 3.5–5)
ALBUMIN UR-MCNC: NEGATIVE G/DL
ALP SERPL-CCNC: 92 U/L (ref 45–120)
ALT SERPL W P-5'-P-CCNC: 20 U/L (ref 0–45)
ANION GAP SERPL CALCULATED.3IONS-SCNC: 14 MMOL/L (ref 5–18)
APPEARANCE UR: CLEAR
AST SERPL W P-5'-P-CCNC: 28 U/L (ref 0–40)
BACTERIA #/AREA URNS HPF: ABNORMAL /[HPF]
BASOPHILS # BLD AUTO: 0 THOU/UL (ref 0–0.2)
BASOPHILS NFR BLD AUTO: 1 % (ref 0–2)
BILIRUB SERPL-MCNC: 0.7 MG/DL (ref 0–1)
BILIRUB UR QL STRIP: NEGATIVE
BUN SERPL-MCNC: 11 MG/DL (ref 8–22)
CALCIUM SERPL-MCNC: 9 MG/DL (ref 8.5–10.5)
CHLORIDE BLD-SCNC: 105 MMOL/L (ref 98–107)
CO2 SERPL-SCNC: 21 MMOL/L (ref 22–31)
COLOR UR AUTO: YELLOW
CREAT SERPL-MCNC: 0.72 MG/DL (ref 0.6–1.1)
EOSINOPHIL # BLD AUTO: 0.1 THOU/UL (ref 0–0.4)
EOSINOPHIL NFR BLD AUTO: 1 % (ref 0–6)
ERYTHROCYTE [DISTWIDTH] IN BLOOD BY AUTOMATED COUNT: 13 % (ref 11–14.5)
GFR SERPL CREATININE-BSD FRML MDRD: >60 ML/MIN/1.73M2
GLUCOSE BLD-MCNC: 104 MG/DL (ref 70–125)
GLUCOSE UR STRIP-MCNC: NEGATIVE MG/DL
HCT VFR BLD AUTO: 38.2 % (ref 35–47)
HGB BLD-MCNC: 12.8 G/DL (ref 12–16)
HGB UR QL STRIP: ABNORMAL
IMM GRANULOCYTES # BLD: 0 THOU/UL
IMM GRANULOCYTES NFR BLD: 0 %
KETONES UR STRIP-MCNC: NEGATIVE MG/DL
LEUKOCYTE ESTERASE UR QL STRIP: ABNORMAL
LYMPHOCYTES # BLD AUTO: 1.4 THOU/UL (ref 0.8–4.4)
LYMPHOCYTES NFR BLD AUTO: 22 % (ref 20–40)
MCH RBC QN AUTO: 30.3 PG (ref 27–34)
MCHC RBC AUTO-ENTMCNC: 33.5 G/DL (ref 32–36)
MCV RBC AUTO: 90 FL (ref 80–100)
MONOCYTES # BLD AUTO: 0.4 THOU/UL (ref 0–0.9)
MONOCYTES NFR BLD AUTO: 6 % (ref 2–10)
MUCOUS THREADS #/AREA URNS LPF: ABNORMAL LPF
NEUTROPHILS # BLD AUTO: 4.4 THOU/UL (ref 2–7.7)
NEUTROPHILS NFR BLD AUTO: 70 % (ref 50–70)
NITRATE UR QL: NEGATIVE
PH UR STRIP: 7 [PH] (ref 5–8)
PLATELET # BLD AUTO: 332 THOU/UL (ref 140–440)
PMV BLD AUTO: 8.6 FL (ref 7–10)
POTASSIUM BLD-SCNC: 4.3 MMOL/L (ref 3.5–5)
PROT SERPL-MCNC: 7.7 G/DL (ref 6–8)
RBC # BLD AUTO: 4.23 MILL/UL (ref 3.8–5.4)
RBC #/AREA URNS AUTO: ABNORMAL HPF
SODIUM SERPL-SCNC: 140 MMOL/L (ref 136–145)
SP GR UR STRIP: 1.02 (ref 1–1.03)
SQUAMOUS #/AREA URNS AUTO: ABNORMAL LPF
UROBILINOGEN UR STRIP-ACNC: ABNORMAL
WBC #/AREA URNS AUTO: ABNORMAL HPF
WBC: 6.4 THOU/UL (ref 4–11)

## 2021-04-14 ASSESSMENT — MIFFLIN-ST. JEOR: SCORE: 1329.41

## 2021-04-15 LAB — BACTERIA SPEC CULT: NO GROWTH

## 2021-05-02 ENCOUNTER — HEALTH MAINTENANCE LETTER (OUTPATIENT)
Age: 61
End: 2021-05-02

## 2021-05-28 ENCOUNTER — RECORDS - HEALTHEAST (OUTPATIENT)
Dept: ADMINISTRATIVE | Facility: CLINIC | Age: 61
End: 2021-05-28

## 2021-05-29 NOTE — PROGRESS NOTES
"Assessment:      Healthy female exam.    1. Routine general medical examination at a health care facility     2. Colon cancer screening  Cologuard   3. Exposure to hepatitis  Hepatitis B Surface Antibody (Anti-HBs) Vaccine Check    Hepatitis Acute Evaluation   4. High risk medications (not anticoagulants) long-term use  Comprehensive Metabolic Panel    HM1(CBC and Differential)    Lipid Profile    Vitamin D, Total (25-Hydroxy)    HM1 (CBC with Diff)   5. Chronic schizophrenia (H)     6. Abnormal involuntary movements            Plan:      This is a 57 yo female here for physical exam:  1.  Health Maintenance - due for colon cancer screening - agrees to Cologuard - referral sent  2.  Hepatitis exposure - will check labs  3.  High risk medication exposure - will check labs  4.  Chronic schizophrenia - continue current medication treatment - follows with psychiatry  5.  Abnormal involuntary movements - due to antipsychotic treatment    Subjective:      Susi Haro is a 58 y.o. female who presents for an annual exam. The patient reports that there is not domestic violence in her life.     Sees psychiatry - once a month - sometimes not able to -   Transportation is an issue  Living in small apartment -   No help at home    Needs tetanus and other shot  Wants full panel and wants to see \"how everything is\"    Wants results sent to [facility]     Healthy Habits:   Regular Exercise: walks now and then - hard to walk because lives in a bad are  Sunscreen Use: No and \"doesn't go out much\" - but it's a good idea  Healthy Diet: cooks for self - tries the best she can  Dental Visits Regularly: Yes and once a year  Seat Belt: Yes and is a licensed   Sexually active: No  Self Breast Exam Monthly:No  Hemoccults: No  Flex Sig: No  Colonoscopy: no - discussed - declines -       Immunization History   Administered Date(s) Administered     Tdap 08/21/2012     Immunization status: up to date and documented, " patient thinks she needs a tetanus shot today.  She notes exposure to previous partner who was + hepatitis B (40 years ago)    No exam data present    Gynecologic History  Patient's last menstrual period was 10/01/2014.  Contraception: post menopausal status  Last Pap: 18. Results were: normal  Last mammogram: 18. Results were: normal      OB History    Para Term  AB Living   0 0 0 0 0 0   SAB TAB Ectopic Multiple Live Births   0 0 0 0         Current Outpatient Medications   Medication Sig Dispense Refill     benztropine (COGENTIN) 0.5 MG tablet Take 0.5 mg by mouth daily.       escitalopram oxalate (LEXAPRO) 20 MG tablet Take 30 mg by mouth bedtime.        traZODone (DESYREL) 150 MG tablet Take 150 mg by mouth bedtime as needed.        zolpidem (AMBIEN) 10 mg tablet Take by mouth bedtime.       No current facility-administered medications for this visit.      Past Medical History:   Diagnosis Date     Failed bunionectomy      Past Surgical History:   Procedure Laterality Date     BUNIONECTOMY Left 2014    [facility] -      Gas enzyme supplement [alpha-d-galactosidase]; Melon flavor; Ragweed; and Trazodone  Family History   Problem Relation Age of Onset     Breast cancer Mother          age 72     Bipolar disorder Mother         and niece     Diabetes Father      Pulmonary embolism Father      Diabetes Paternal Grandmother      Diabetes Paternal Grandfather      Hypertension Brother      Sleep apnea Brother      Social History     Socioeconomic History     Marital status: Single     Spouse name: Not on file     Number of children: Not on file     Years of education: Not on file     Highest education level: Not on file   Occupational History     Occupation: permanent disability     Employer: DISABLED     Comment: mental health; neck, foot   Social Needs     Financial resource strain: Not on file     Food insecurity:     Worry: Not on file     Inability: Not on file      "Transportation needs:     Medical: Not on file     Non-medical: Not on file   Tobacco Use     Smoking status: Former Smoker     Types: Cigarettes     Smokeless tobacco: Never Used     Tobacco comment: stopped at age 18   Substance and Sexual Activity     Alcohol use: No     Comment: rare use     Drug use: No     Sexual activity: Never   Lifestyle     Physical activity:     Days per week: Not on file     Minutes per session: Not on file     Stress: Not on file   Relationships     Social connections:     Talks on phone: Not on file     Gets together: Not on file     Attends [employer] service: Not on file     Active member of club or organization: Not on file     Attends meetings of clubs or organizations: Not on file     Relationship status: Not on file     Intimate partner violence:     Fear of current or ex partner: Not on file     Emotionally abused: Not on file     Physically abused: Not on file     Forced sexual activity: Not on file   Other Topics Concern     Not on file   Social History Narrative    Lives in own room; in dad's house       Review of Systems  Review of Systems     Pertinent positives as noted in HPI; otherwise 12 point ROS negative.        Objective:         Vitals:    06/10/19 0926   BP: 102/70   Pulse: 64   Resp: 20   Temp: 97.4  F (36.3  C)   TempSrc: Oral   Weight: 155 lb (70.3 kg)   Height: 5' 3\" (1.6 m)     Body mass index is 27.46 kg/m .    Physical  Physical Exam     EXAM:  /70 (Patient Site: Left Arm, Patient Position: Sitting, Cuff Size: Adult Regular)   Pulse 64   Temp 97.4  F (36.3  C) (Oral)   Resp 20   Ht 5' 3\" (1.6 m)   Wt 155 lb (70.3 kg)   LMP 10/01/2014   BMI 27.46 kg/m     Gen:  NAD, appears well, well-hydrated, disheveled, pulling at hair frequently  HEENT:  TMs nl, oropharynx benign, nasal mucosa nl, conjunctiva clear  Neck:  Supple, no adenopathy, no thyromegaly, no carotid bruits, no JVD  Lungs:  Clear to auscultation bilaterally  Breast exam:  No breast lumps, " no skin changes, no nipple discharge, no axillary adenopathy  Cor:  RRR no murmur  Abd:  Soft, nontender, BS+, no masses, no guarding or rebound, no HSM  Extr:  Neg.  Neuro:  No asymmetry, involuntary tics; Nl motor tone/strength, nl sensation, reflexes =, gait nl, nl coordination, CN intact,   Skin:  Warm/dry

## 2021-05-30 VITALS — BODY MASS INDEX: 26.63 KG/M2 | WEIGHT: 156 LBS | HEIGHT: 64 IN

## 2021-05-30 NOTE — PROGRESS NOTES
ASSESSMENT/PLAN:  1. Urinary frequency  Urinalysis    Culture, Urine    sulfamethoxazole-trimethoprim (SEPTRA DS) 800-160 mg per tablet   2. Deformity of left thumb joint  Ambulatory referral to Orthopedics       This is a 58 yo female with:  1.  Urinary frequency - symptoms suggest UTI - will check UA/UC - but treat presumptively for UTI.  Will treat with sulfa.  2.  Deformity of left thumb joint - no specific injury - will refer to Hand surgery for evaluation - likely has had a ligamentous injury in past - abnormal movement in thumb.    Return in about 1 month (around 8/17/2019) for Recheck.      There are no discontinued medications.  There are no Patient Instructions on file for this visit.    Chief Complaint:  Chief Complaint   Patient presents with     Urinary Tract Infection       HPI:   Susi Haro is a 59 y.o. female c/o  1.  Urinary frequency, some dysuria, some urgency  No fever, no chills  X several days  2.  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     Family history of diabetes mellitus 09/25/2015     Abnormal involuntary movements 01/20/2015     Nonspecific abnormal finding 12/10/2013     Atypical bipolar affective disorder (H) 12/09/2008     Insomnia 12/09/2008     Cervicalgia 12/09/2008     Past Medical History:   Diagnosis Date     Failed bunionectomy      Past Surgical History:   Procedure Laterality Date     BUNIONECTOMY Left July 8, 2014    [facility] -      Social History     Socioeconomic History     Marital status: Single     Spouse name: Not on file     Number of children: Not on file     Years  of education: Not on file     Highest education level: Not on file   Occupational History     Occupation: permanent disability     Employer: DISABLED     Comment: mental health; neck, foot   Social Needs     Financial resource strain: Not on file     Food insecurity:     Worry: Not on file     Inability: Not on file     Transportation needs:     Medical: Not on file     Non-medical: Not on file   Tobacco Use     Smoking status: Former Smoker     Types: Cigarettes     Smokeless tobacco: Never Used     Tobacco comment: stopped at age 18   Substance and Sexual Activity     Alcohol use: No     Comment: rare use     Drug use: No     Sexual activity: Never   Lifestyle     Physical activity:     Days per week: Not on file     Minutes per session: Not on file     Stress: Not on file   Relationships     Social connections:     Talks on phone: Not on file     Gets together: Not on file     Attends Yarsani service: Not on file     Active member of club or organization: Not on file     Attends meetings of clubs or organizations: Not on file     Relationship status: Not on file     Intimate partner violence:     Fear of current or ex partner: Not on file     Emotionally abused: Not on file     Physically abused: Not on file     Forced sexual activity: Not on file   Other Topics Concern     Not on file   Social History Narrative    Lives in own room; in dad's house     Family History   Problem Relation Age of Onset     Breast cancer Mother          age 72     Bipolar disorder Mother         and niece     Diabetes Father      Pulmonary embolism Father      Diabetes Paternal Grandmother      Diabetes Paternal Grandfather      Hypertension Brother      Sleep apnea Brother        Meds:    Current Outpatient Medications:      benztropine (COGENTIN) 0.5 MG tablet, Take 0.5 mg by mouth daily., Disp: , Rfl:      escitalopram oxalate (LEXAPRO) 20 MG tablet, Take 30 mg by mouth bedtime. , Disp: , Rfl:      traZODone (DESYREL) 150 MG  tablet, Take 150 mg by mouth bedtime as needed. , Disp: , Rfl:      zolpidem (AMBIEN) 10 mg tablet, Take by mouth bedtime., Disp: , Rfl:     Allergies:  Allergies   Allergen Reactions     Gas Enzyme Supplement [Alpha-D-Galactosidase]      Melon Flavor      Ragweed      Trazodone Other (See Comments)     heart burn       ROS:  Pertinent positives as noted in HPI; otherwise 12 point ROS negative.      Physical Exam:  EXAM:  /72 (Patient Site: Left Arm, Patient Position: Sitting, Cuff Size: Adult Regular)   Pulse 77   Resp 18   Wt 157 lb (71.2 kg)   LMP 10/01/2014   BMI 27.81 kg/m     Gen:  NAD, appears well, well-hydrated  HEENT:  TMs nl, oropharynx benign, nasal mucosa nl, conjunctiva clear  Neck:  Supple, no adenopathy, no thyromegaly, no carotid bruits, no JVD  Lungs:  Clear to auscultation bilaterally  Cor:  RRR no murmur  Abd:  Soft, nontender, BS+, no masses, no guarding or rebound, no HSM, no CVAT  Extr:  Neg. Except left thumb joint - unable to fully flex/extend DIP joint of thumb  Neuro:  No asymmetry  Skin:  Warm/dry        Results:  Results for orders placed or performed in visit on 07/17/19   Culture, Urine   Result Value Ref Range    Culture No Growth    Urinalysis   Result Value Ref Range    Color, UA Yellow Colorless, Yellow, Straw, Light Yellow    Clarity, UA Clear Clear    Glucose, UA Negative Negative    Bilirubin, UA Negative Negative    Ketones, UA Trace (!) Negative    Specific Gravity, UA 1.020 1.005 - 1.030    Blood, UA Negative Negative    pH, UA 6.5 5.0 - 8.0    Protein, UA Negative Negative mg/dL    Urobilinogen, UA 0.2 E.U./dL 0.2 E.U./dL, 1.0 E.U./dL    Nitrite, UA Negative Negative    Leukocytes, UA Negative Negative

## 2021-05-30 NOTE — PROGRESS NOTES
"ASSESSMENT/PLAN:  1. Vaginal discharge  Wet Prep, Vaginal   2. Genital warts         This is a 58 yo female with  1.   vaginal discharge.  She complains of discharge, some itching.  Will check wet prep and treat appropriately.  Wet prep is negative today.  2.  Genital warts - she has 3 typical genital warts in perianal area - on right side - these were all frozen x 3 with liquid nitrogen therapy, with good tolerance.    Return in about 1 month (around 8/22/2019) for Recheck.      There are no discontinued medications.  There are no Patient Instructions on file for this visit.    Chief Complaint:  Chief Complaint   Patient presents with     wart removal       HPI:   Susi Haro is a 59 y.o. female c/o  1.  Vaginal discharge - some itching  2.  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     Family history of diabetes mellitus 09/25/2015     Abnormal involuntary movements 01/20/2015     Nonspecific abnormal finding 12/10/2013     Atypical bipolar affective disorder (H) 12/09/2008     Insomnia 12/09/2008     Cervicalgia 12/09/2008     Past Medical History:   Diagnosis Date     Failed bunionectomy      Past Surgical History:   Procedure Laterality Date     BUNIONECTOMY Left July 8, 2014    [facility] -      Social History     Socioeconomic History     Marital status: Single     Spouse name: Not on file     Number of children: Not on file     Years of education: Not on file     Highest education level: Not on file   Occupational History     Occupation: permanent disability     Employer: DISABLED     Comment: mental health; neck, foot   Social " Needs     Financial resource strain: Not on file     Food insecurity:     Worry: Not on file     Inability: Not on file     Transportation needs:     Medical: Not on file     Non-medical: Not on file   Tobacco Use     Smoking status: Former Smoker     Types: Cigarettes     Smokeless tobacco: Never Used     Tobacco comment: stopped at age 18   Substance and Sexual Activity     Alcohol use: No     Comment: rare use     Drug use: No     Sexual activity: Never   Lifestyle     Physical activity:     Days per week: Not on file     Minutes per session: Not on file     Stress: Not on file   Relationships     Social connections:     Talks on phone: Not on file     Gets together: Not on file     Attends Taoism service: Not on file     Active member of club or organization: Not on file     Attends meetings of clubs or organizations: Not on file     Relationship status: Not on file     Intimate partner violence:     Fear of current or ex partner: Not on file     Emotionally abused: Not on file     Physically abused: Not on file     Forced sexual activity: Not on file   Other Topics Concern     Not on file   Social History Narrative    Lives in own room; in dad's house     Family History   Problem Relation Age of Onset     Breast cancer Mother          age 72     Bipolar disorder Mother         and niece     Diabetes Father      Pulmonary embolism Father      Diabetes Paternal Grandmother      Diabetes Paternal Grandfather      Hypertension Brother      Sleep apnea Brother        Meds:    Current Outpatient Medications:      benztropine (COGENTIN) 0.5 MG tablet, Take 0.5 mg by mouth daily., Disp: , Rfl:      escitalopram oxalate (LEXAPRO) 20 MG tablet, Take 30 mg by mouth bedtime. , Disp: , Rfl:      traZODone (DESYREL) 150 MG tablet, Take 150 mg by mouth bedtime as needed. , Disp: , Rfl:      zolpidem (AMBIEN) 10 mg tablet, Take by mouth bedtime., Disp: , Rfl:     Allergies:  Allergies   Allergen Reactions     Gas Enzyme  "Supplement [Alpha-D-Galactosidase]      Melon Flavor      Ragweed      Trazodone Other (See Comments)     heart burn       ROS:  Pertinent positives as noted in HPI; otherwise 12 point ROS negative.      Physical Exam:  EXAM:  /66 (Patient Site: Right Arm, Patient Position: Sitting, Cuff Size: Adult Regular)   Pulse 75   Temp 97.8  F (36.6  C) (Oral)   Resp 16   Ht 5' 3\" (1.6 m)   Wt 160 lb 4.8 oz (72.7 kg)   LMP 10/01/2014   Breastfeeding? No   BMI 28.40 kg/m     Gen:  NAD, appears well, well-hydrated  HEENT:  TMs nl, oropharynx benign, nasal mucosa nl, conjunctiva clear  Neck:  Supple, no adenopathy, no thyromegaly, no carotid bruits, no JVD  Lungs:  Clear to auscultation bilaterally  Cor:  RRR no murmur  Abd:  Soft, nontender, BS+, no masses, no guarding or rebound, no HSM  PELVIC EXAM:External genitalia: normal  Vaginal mucosa normal  Vaginal discharge: white/yellow  Speculum exam shows a normal appearing cervix .   Bimanual exam: Cervix closed, firm, non tender  to motion.  Uterus  firm, regular, mobile, non tender to palpation. No adnexal masses or tenderness.   :  3 perianal warts present - all on right side - all frozen x 3 with liquid nitrogen  Extr:  Neg.  Neuro:  No asymmetry  Skin:  Warm/dry        Results:  Results for orders placed or performed in visit on 07/22/19   Wet Prep, Vaginal   Result Value Ref Range    Yeast Result No yeast seen No yeast seen    Trichomonas No Trichomonas seen No Trichomonas seen    Clue Cells, Wet Prep No Clue cells seen No Clue cells seen             "

## 2021-05-31 VITALS — WEIGHT: 156 LBS | HEIGHT: 64 IN | BODY MASS INDEX: 26.63 KG/M2

## 2021-06-01 VITALS — WEIGHT: 161 LBS | BODY MASS INDEX: 27.85 KG/M2

## 2021-06-01 VITALS — WEIGHT: 155 LBS | BODY MASS INDEX: 26.46 KG/M2 | HEIGHT: 64 IN

## 2021-06-02 NOTE — TELEPHONE ENCOUNTER
RN Triage:     Patient is calling in stating that she is having urgency and incontinence which is new. NO fever, flank pain or blood in the urine. Patient advised to be seen today for her symptoms. Patient made a 10 am appointment today.   Anabell Daley RN, BSN [facility] Triage Nurse        Reason for Disposition    [1] Can't control passage of urine (i.e., urinary incontinence) AND [2] new onset (< 2 weeks) or worsening    Urinating more frequently than usual (i.e., frequency)    Protocols used: URINARY SYMPTOMS-A-AH

## 2021-06-02 NOTE — TELEPHONE ENCOUNTER
It doesn't look like patient showed up for this appointment?  Could she at least come in for a UA/UC?  I'll write an order.

## 2021-06-02 NOTE — TELEPHONE ENCOUNTER
"Mail box is full unable to leave message. Will try again later. #1  \" Okay to relay message\"    "

## 2021-06-02 NOTE — TELEPHONE ENCOUNTER
Called. Unable to leave voicemail due to mailbox is full. #3     Would you like us to send out a letter??

## 2021-06-03 VITALS — HEIGHT: 63 IN | BODY MASS INDEX: 27.46 KG/M2 | WEIGHT: 155 LBS

## 2021-06-03 VITALS — WEIGHT: 157 LBS | BODY MASS INDEX: 27.81 KG/M2

## 2021-06-03 VITALS — WEIGHT: 160.3 LBS | HEIGHT: 63 IN | BODY MASS INDEX: 28.4 KG/M2

## 2021-06-04 VITALS
BODY MASS INDEX: 28.35 KG/M2 | WEIGHT: 160 LBS | RESPIRATION RATE: 16 BRPM | DIASTOLIC BLOOD PRESSURE: 73 MMHG | HEIGHT: 63 IN | SYSTOLIC BLOOD PRESSURE: 112 MMHG | HEART RATE: 76 BPM | TEMPERATURE: 98.8 F

## 2021-06-05 VITALS
WEIGHT: 173.7 LBS | SYSTOLIC BLOOD PRESSURE: 126 MMHG | DIASTOLIC BLOOD PRESSURE: 78 MMHG | OXYGEN SATURATION: 94 % | BODY MASS INDEX: 30.78 KG/M2 | HEIGHT: 63 IN | RESPIRATION RATE: 12 BRPM | TEMPERATURE: 98 F | HEART RATE: 81 BPM

## 2021-06-09 NOTE — PROGRESS NOTES
"Assessment:      Healthy female exam.    1. Routine general medical examination at a health care facility     2. Atypical bipolar affective disorder (H)     3. Dermatitis  triamcinolone (KENALOG) 0.1 % cream   4. Prediabetes  Glycosylated Hemoglobin A1c   5. Screening for human immunodeficiency virus without presence of risk factors  HIV Antigen/Antibody Screening [place]   6. Colon cancer screening  Cologuard   7. Encounter for screening mammogram for breast cancer  Mammo Screening Bilateral          Plan:      This is a 59 yo female here for physical exam:  1.  Atypical Bipolar Affective Disorder - patient is currently living independently but in some sort of supervised apartment.  She is still under the care of a psychiatrist.  Mental Health is generally stable.    2.  Dermatitis - uses Triamcinolone as needed - reordered  3.  Prediabetes - check A1c  4.  Health Maintenance - due for colon cancer screening - agrees to Cologuard; due for HIV screening - check HIV; due for mammogram - ordered    Subjective:      Susi Haro is a 60 y.o. female who presents for an annual exam.  The patient reports that there is not domestic violence in her life.     Here for physical  Just moved to [facility] apartment at [facility] [address]   Cooks for self -     On Risperidone and Metformin; Cogentin two times a day, MVI, melatonin,   Wants \"accurate A1c\"  Didn't take it yesterday  (Metformin)    Had skin infection - did blood workup - went to [facility], then [facility] -   Finished antibiotics - had some sort of rash -     Slept better with Zolpidem - but not on that any more        Healthy Habits:   Regular Exercise: No and limited by \"bad foot surgery\"  Sunscreen Use: No  Healthy Diet: No  Dental Visits Regularly: Yes  Seat Belt: Yes  Sexually active: No  Self Breast Exam Monthly:No  Hemoccults: No  Flex Sig: No  Colonoscopy: No        Immunization History   Administered Date(s) Administered     Tdap 08/21/2012     Immunization " status: reviewed.    No exam data present    Gynecologic History  Patient's last menstrual period was 10/01/2014.  Contraception: none  Last Pap:2018. Results were: normal  Last mammogram:2018. Results were: normal      OB History    Para Term  AB Living   0 0 0 0 0 0   SAB TAB Ectopic Multiple Live Births   0 0 0 0         Current Outpatient Medications   Medication Sig Dispense Refill     benztropine (COGENTIN) 0.5 MG tablet Take 0.5 mg by mouth daily.       CEROVITE ADVANCED FORMULA  mg-mcg Tab TAKE 1 TABLET BY MOUTH DAILY 100 tablet 3     triamcinolone (KENALOG) 0.1 % cream Apply topically sparingly two times a day to affected area only (legs) 60 g 1     No current facility-administered medications for this visit.      Past Medical History:   Diagnosis Date     Failed bunionectomy      Past Surgical History:   Procedure Laterality Date     BUNIONECTOMY Left 2014    [facility] -      Gas enzyme supplement [alpha-d-galactosidase]; Melon flavor; Ragweed; and Trazodone  Family History   Problem Relation Age of Onset     Breast cancer Mother          age 72     Bipolar disorder Mother         and niece     Diabetes Father      Pulmonary embolism Father      Diabetes Paternal Grandmother      Diabetes Paternal Grandfather      Hypertension Brother      Sleep apnea Brother      Social History     Socioeconomic History     Marital status: Single     Spouse name: Not on file     Number of children: Not on file     Years of education: Not on file     Highest education level: Not on file   Occupational History     Occupation: permanent disability     Employer: DISABLED     Comment: mental health; neck, foot   Social Needs     Financial resource strain: Not on file     Food insecurity     Worry: Not on file     Inability: Not on file     Transportation needs     Medical: Not on file     Non-medical: Not on file   Tobacco Use     Smoking status: Former Smoker     Types: Cigarettes      "Smokeless tobacco: Never Used     Tobacco comment: stopped at age 18   Substance and Sexual Activity     Alcohol use: No     Comment: rare use     Drug use: No     Sexual activity: Never   Lifestyle     Physical activity     Days per week: Not on file     Minutes per session: Not on file     Stress: Not on file   Relationships     Social connections     Talks on phone: Not on file     Gets together: Not on file     Attends Anabaptist service: Not on file     Active member of club or organization: Not on file     Attends meetings of clubs or organizations: Not on file     Relationship status: Not on file     Intimate partner violence     Fear of current or ex partner: Not on file     Emotionally abused: Not on file     Physically abused: Not on file     Forced sexual activity: Not on file   Other Topics Concern     Not on file   Social History Narrative    Lives in own room; in dad's house       Review of Systems  Review of Systems     Pertinent positives as noted in HPI; otherwise 12 point ROS negative.        Objective:         Vitals:    07/15/20 1429   BP: 112/73   Pulse: 76   Resp: 16   Temp: 98.8  F (37.1  C)   TempSrc: Tympanic   Weight: 160 lb (72.6 kg)   Height: 5' 2.5\" (1.588 m)     Body mass index is 28.8 kg/m .    Physical  Physical Exam    EXAM:  /73 (Patient Site: Right Arm, Patient Position: Sitting, Cuff Size: Adult Regular)   Pulse 76   Temp 98.8  F (37.1  C) (Tympanic)   Resp 16   Ht 5' 2.5\" (1.588 m)   Wt 160 lb (72.6 kg)   LMP 10/01/2014   BMI 28.80 kg/m     Gen:  NAD, appears well, well-hydrated  HEENT:  TMs nl, oropharynx benign, nasal mucosa nl, conjunctiva clear  Neck:  Supple, no adenopathy, no thyromegaly, no carotid bruits, no JVD  Lungs:  Clear to auscultation bilaterally  Breast exam:  No breast lumps, no skin changes, no nipple discharge, no axillary adenopathy  Cor:  RRR no murmur  Abd:  Soft, nontender, BS+, no masses, no guarding or rebound, no HSM  Extr:  Deformity " bilateral feet, chronic  Neuro:  No asymmetry  Skin:  Warm/dry

## 2021-06-09 NOTE — PROGRESS NOTES
Assessment:      Healthy female exam.    1. Routine general medical examination at a health care facility  Gynecologic Cytology (PAP Smear)    Thyroid Stimulating Hormone (TSH)    HPV Cascade (PCR)   2. Colon cancer screening  Ambulatory referral for Colonoscopy   3. Breast cancer screening  CANCELED: Mammo Diagnostic Bilateral   4. Chronic schizophrenia  Comprehensive Metabolic Panel    Lipid Profile    HM1(CBC and Differential)    Glycosylated Hemoglobin A1c    Valproic Acid (Depakene )    HM1 (CBC with Diff)    CANCELED: Thyroid Stimulating Hormone (TSH)   5. Dysuria  Urinalysis-UC if Indicated          Plan:      55 yo female - seen for physical exam.  Has chronic schizophrenia - lives independently in father's home.  Generally stable in terms of mental health.    Will check labs - patient on drugs with potential harmful side effects.      Also c/o dysuria - will check UA/UC.  Treat if culture positive.    Subjective:      Susi Haro is a 56 y.o. female who presents for an annual exam.  The patient reports that there is not domestic violence in her life.     Foot is still screwed up from her bunionectomy.  Has had acouple cortisone shots in hand for trigger finger - pain.  Lost flexibility - left thumb and index finger    Healthy Habits:   Regular Exercise: walking  Sunscreen Use: doesn't use regularly  Healthy Diet: No  Dental Visits Regularly: Yes, has a lot of dental problems (with gum recession)  Seat Belt: Yes  Sexually active: No  Self Breast Exam Monthly:No  Hemoccults: No  Flex Sig: No  Colonoscopy: No        Immunization History   Administered Date(s) Administered     Tdap 2012     Immunization status: reviewed.    No exam data present    Gynecologic History  Patient's last menstrual period was 10/01/2014.  Contraception: post menopausal status  Last Pap: 2016. Results were: abnormal - LGSIL  Last mammogram: 16. Results were: normal      OB History    Para Term  AB SAB  TAB Ectopic Multiple Living   0 0 0 0 0 0 0 0 0 0             Current Outpatient Prescriptions   Medication Sig Dispense Refill     benztropine (COGENTIN) 0.5 MG tablet Take 0.5 mg by mouth 2 (two) times a day.        divalproex (DEPAKOTE) 500 MG EC tablet Take 1,000 mg by mouth once daily.        escitalopram oxalate (LEXAPRO) 20 MG tablet Take 30 mg by mouth bedtime.        risperiDONE (RISPERDAL) 4 MG tablet Take by mouth bedtime.       traZODone (DESYREL) 150 MG tablet Take 150 mg by mouth bedtime as needed.        zolpidem (AMBIEN) 10 mg tablet Take by mouth bedtime.       No current facility-administered medications for this visit.      Past Medical History:   Diagnosis Date     Failed bunionectomy      Past Surgical History:   Procedure Laterality Date     BUNIONECTOMY Left 2014    [facility] -      Gas enzyme supplement [alpha-d-galactosidase]; Melon flavor; Ragweed; and Trazodone  Family History   Problem Relation Age of Onset     Breast cancer Mother       age 72     Bipolar disorder Mother      and niece     Diabetes Father      Pulmonary embolism Father      Diabetes Paternal Grandmother      Diabetes Paternal Grandfather      Hypertension Brother      Sleep apnea Brother      Social History     Social History     Marital status: Single     Spouse name: N/A     Number of children: N/A     Years of education: N/A     Occupational History     permanent disability Disabled     mental health; neck, foot     Social History Main Topics     Smoking status: Former Smoker     Types: Cigarettes     Smokeless tobacco: Never Used      Comment: stopped at age 18     Alcohol use No      Comment: rare use     Drug use: No     Sexual activity: No     Other Topics Concern     Not on file     Social History Narrative    Lives in own room; in dad's house       Review of Systems  Review of Systems     Pertinent positives as noted in HPI; otherwise 12 point ROS negative.        Objective:         Vitals:     "04/07/17 0955   BP: 102/58   Pulse: 74   Resp: 20   Weight: 156 lb (70.8 kg)   Height: 5' 3.75\" (1.619 m)     Body mass index is 26.99 kg/(m^2).    Physical  Physical Exam    EXAM:  /58 (Patient Site: Left Arm, Patient Position: Sitting, Cuff Size: Adult Regular)  Pulse 74  Resp 20  Ht 5' 3.75\" (1.619 m)  Wt 156 lb (70.8 kg)  LMP 10/01/2014  BMI 26.99 kg/m2   Gen:  NAD, appears well, well-hydrated  HEENT:  TMs nl, oropharynx benign, nasal mucosa nl, conjunctiva clear  Neck:  Supple, no adenopathy, no thyromegaly, no carotid bruits, no JVD  Lungs:  Clear to auscultation bilaterally  Cor:  RRR no murmur  Abd:  Soft, nontender, BS+, no masses, no guarding or rebound, no HSM  Extr:  Foot deformity due to previous bunionectomy;   Neuro:  No asymmetry  Skin:  Warm/dry        "

## 2021-06-14 NOTE — PROGRESS NOTES
Assessment/ Plan  1. Urinary frequency  Probably due to acute cystitis.  Empiric treatment with trimethoprim sulfa.  Follow-up if not improving, culture urine  - Urinalysis  - Culture, Urine    Body mass index is 26.99 kg/(m^2).    Subjective  CC:  Chief Complaint   Patient presents with     urine problem     HPI:  Frequency of Urination    --------------------  Duration/Onset 2-3 weeks  Associated Dysuria? no  Urinary urgency? yes  Change in urinary appearance or smell? Not really  Previous history of UTI?- yes-   How frequent during the day?- alot  Average times urinating overnight? No big change  Stopped having periods 3-4 years ago  Patient Active Problem List   Diagnosis     Atypical bipolar affective disorder     Abnormal involuntary movements     Nonspecific abnormal finding     Insomnia     Cervicalgia     Family history of diabetes mellitus     Onychauxis     Chronic schizophrenia     Low back pain     Current medications reviewed as follows:  Current Outpatient Prescriptions on File Prior to Visit   Medication Sig     benztropine (COGENTIN) 0.5 MG tablet Take 0.5 mg by mouth 2 (two) times a day.      divalproex (DEPAKOTE) 500 MG EC tablet Take 1,000 mg by mouth once daily.      escitalopram oxalate (LEXAPRO) 20 MG tablet Take 30 mg by mouth bedtime.      risperiDONE (RISPERDAL) 4 MG tablet Take by mouth bedtime.     traZODone (DESYREL) 150 MG tablet Take 150 mg by mouth bedtime as needed.      zolpidem (AMBIEN) 10 mg tablet Take by mouth bedtime.     No current facility-administered medications on file prior to visit.      History   Smoking Status     Former Smoker     Types: Cigarettes   Smokeless Tobacco     Never Used     Comment: stopped at age 18     Social History     Social History Narrative    Lives in own room; in dad's house     Patient Care Team:  Cara Garcia MD as PCP - General (Family Medicine)  ROS  Negative constitutional, urologic, GYN  Objective  Physical Exam  Vitals:     "11/17/17 1626   BP: 98/52   Pulse: 87   Resp: 20   SpO2: 98%   Weight: 156 lb (70.8 kg)   Height: 5' 3.75\" (1.619 m)     Pleasant female, no distress, good color  Dominant soft, no significant flank tenderness  Diagnostics  Results for orders placed or performed in visit on 11/17/17   Urinalysis   Result Value Ref Range    Color, UA Yellow Colorless, Yellow, Straw, Light Yellow    Clarity, UA Clear Clear    Glucose, UA Negative Negative    Bilirubin, UA Negative Negative    Ketones, UA Negative Negative    Specific Gravity, UA <=1.005 1.005 - 1.030    Blood, UA Trace (!) Negative    pH, UA 6.0 5.0 - 8.0    Protein, UA Negative Negative mg/dL    Urobilinogen, UA 0.2 E.U./dL 0.2 E.U./dL, 1.0 E.U./dL    Nitrite, UA Negative Negative    Leukocytes, UA Small (!) Negative    Bacteria, UA Few (!) None Seen hpf    RBC, UA 3-5 (!) None Seen, 0-2 hpf    WBC, UA 5-10 (!) None Seen, 0-5 hpf    Squam Epithel, UA 0-5 None Seen, 0-5 lpf         Please note: Voice recognition software was used in this dictation.  It may therefore contain typographical errors.          "

## 2021-06-16 NOTE — PROGRESS NOTES
OUTPATIENT VISIT NOTE                                                   Date of Visit: 4/14/2021     Chief Complaint   Chief Complaint   Patient presents with     Altered Mental Status         History of Present Illness   Susi Haro is a 60 y.o. female with nurse from her home with concerns about some confusion and disorganization.  Nurse states the patient is generally independently functioning.  She lives in an apartment by herself and has twice daily nursing checks.  For about the last week, patient has had decreased fluency in speech and increased dystonic movements.  Nurse states these problems have come up in the past when the patient has had an infection, but also occur when the patient has had increase in stressors.  The nurse does not know of any increased stressors.  Patient did c/o of some increased urinary frequency so was seen in an Urgent care on 4/9 with UA and wet prep performed, both of which were apparently normal.  She was seen again on yesterday in an Urgent care with an A1C obtained which per verbal report to the nurse was normal.    No fever.  No sweats.  States she has had some shakes.  No sore throat.  No ear pain.  No cough, shortness of breath or chest pain.  No nausea, vomiting, diarrhea or abdominal.  No increase in urinary symptoms.  Denies any painful areas of body.  No headache.  No weakness of  Had Cristino [employer] Crist[employer] covid vaccination on 4/8/2021         MEDICATIONS   Current Outpatient Medications on File Prior to Visit   Medication Sig Dispense Refill     benztropine (COGENTIN) 0.5 MG tablet Take 0.5 mg by mouth daily.       CEROVITE ADVANCED FORMULA  mg-mcg Tab TAKE 1 TABLET BY MOUTH DAILY 100 tablet 3     melatonin 3 mg Tab tablet Take 1 tablet by mouth as needed.       risperiDONE (RISPERDAL) 3 MG tablet Take 1 tablet by mouth at bedtime.       zolpidem (AMBIEN) 5 MG tablet Take 1 tablet by mouth at bedtime.       triamcinolone (KENALOG) 0.1 % cream Apply topically  "sparingly two times a day to affected area only (legs) 60 g 1     No current facility-administered medications on file prior to visit.          SOCIAL HISTORY   Social History     Tobacco Use     Smoking status: Former Smoker     Types: Cigarettes     Smokeless tobacco: Never Used     Tobacco comment: stopped at age 18   Substance Use Topics     Alcohol use: No     Comment: rare use           Physical Exam   Vitals:    04/14/21 1010   BP: 126/78   Pulse: 81   Resp: 12   Temp: 98  F (36.7  C)   TempSrc: Oral   SpO2: 94%   Weight: 173 lb 11.2 oz (78.8 kg)   Height: 5' 2.52\" (1.588 m)        GENERAL:   Alert. Oriented.  EYES: Clear  HENT:  Ears: R TM pearly gray. Normal landmarks. L TM pearly gray.  Normal landmarks  Nose: Clear.  Sinuses: Nontender.  Oropharynx:  No erythema. No exudate.  NECK: Supple. No adenopathy.  LUNGS: Clear to ascultation.  No crackles.  No wheezing  HEART: RRR  SKIN:  No rash.   ABDOMEN:  +BS. No tenderness. Soft, no guarding, rebound, rigidity,mass, or organomegaly. No CVA tenderness    SKIN:  Without lesions.       Diagnostic Studies   LABS:  Results for orders placed or performed in visit on 04/14/21   Urinalysis-UC if Indicated   Result Value Ref Range    Color, UA Yellow Colorless, Yellow, Straw, Light Yellow    Clarity, UA Clear Clear    Glucose, UA Negative Negative    Protein, UA Negative Negative    Bilirubin, UA Negative Negative    Urobilinogen, UA 0.2 E.U./dL 0.2 E.U./dL, 1.0 E.U./dL    pH, UA 7.0 5.0 - 8.0    Blood, UA Trace (!) Negative    Ketones, UA Negative Negative    Nitrite, UA Negative Negative    Leukocytes, UA Trace (!) Negative    Specific Gravity, UA 1.025 1.005 - 1.030    RBC, UA 0-2 None Seen, 0-2 hpf    WBC, UA 0-5 None Seen, 0-5 hpf    Bacteria, UA None Seen None Seen    Squam Epithel, UA 0-5 None Seen, 0-5 lpf    Mucus, UA Moderate (!) None Seen lpf   HM1 (CBC with Diff)   Result Value Ref Range    WBC 6.4 4.0 - 11.0 thou/uL    RBC 4.23 3.80 - 5.40 mill/uL    " Hemoglobin 12.8 12.0 - 16.0 g/dL    Hematocrit 38.2 35.0 - 47.0 %    MCV 90 80 - 100 fL    MCH 30.3 27.0 - 34.0 pg    MCHC 33.5 32.0 - 36.0 g/dL    RDW 13.0 11.0 - 14.5 %    Platelets 332 140 - 440 thou/uL    MPV 8.6 7.0 - 10.0 fL    Neutrophils % 70 50 - 70 %    Lymphocytes % 22 20 - 40 %    Monocytes % 6 2 - 10 %    Eosinophils % 1 0 - 6 %    Basophils % 1 0 - 2 %    Immature Granulocyte % 0 <=0 %    Neutrophils Absolute 4.4 2.0 - 7.7 thou/uL    Lymphocytes Absolute 1.4 0.8 - 4.4 thou/uL    Monocytes Absolute 0.4 0.0 - 0.9 thou/uL    Eosinophils Absolute 0.1 0.0 - 0.4 thou/uL    Basophils Absolute 0.0 0.0 - 0.2 thou/uL    Immature Granulocyte Absolute 0.0 <=0.0 thou/uL            Assessment and Plan   1. Decreased functional activity tolerance  HM1(CBC and Differential)    Urinalysis-UC if Indicated    Comprehensive Metabolic Panel    Culture, Urine    CANCELED: Culture, Urine         Patient appears well with normal exam.  CBC and UA normal.  Urine will be sent for culture to confirm.  CMP also ordered.    Nursing staff will continue to watch closely.  If problems, needs to be rechecked.      Discussed signs / symptoms that warrant urgent / emergent medical attention.     Recheck if worsening or not improving.       Kody Ibarra MD        Pertinent History     The following portions of the patient's history were reviewed and updated as appropriate: allergies, current medications, past family history, past medical history, past social history, past surgical history and problem list.

## 2021-06-16 NOTE — TELEPHONE ENCOUNTER
"Caller with assist  from  aide reports urinary frequency for one day; states seh went \" 20 times\" during the night.  More anxious. No fever; Care assistant at her  Living facility is able to take her to  at [facility] now.  Advised to proceed   Adela Bello RN  FNA      Reason for Disposition    Age > 50 years    Additional Information    Negative: Shock suspected (e.g., cold/pale/clammy skin, too weak to stand, low BP, rapid pulse)    Negative: Sounds like a life-threatening emergency to the triager    Negative: Unable to urinate (or only a few drops) and bladder feels very full    Negative: Vomiting    Negative: Patient sounds very sick or weak to the triager    Negative: Severe pain with urination    Negative: Fever > 100.5 F (38.1 C)    Negative: Side (flank) or lower back pain present    Protocols used: URINATION PAIN - FEMALE-A-OH      "

## 2021-06-16 NOTE — PROGRESS NOTES
Chief Complaint:  Chief Complaint   Patient presents with     Urinary Tract Infection     urgency to urinate and frequent urination     HPI:   Susi Haro is a 57 y.o. female with history of schizophrenia, here today with concern for possible UTI.  She saw Dr. Porter for possible UTI on 11/17/2017.  I reviewed those lab results today.  Dr. Porter initially treated her with an antibiotic, but ultimately urine culture came back negative.  He recommended she stop the antibiotic.  Her symptoms did resolve.    Similar symptoms have returned now for a day or so.  She describes urinary urgency, and that it takes longer to urinate.  No dysuria, no blood in her urine, no fevers.  She thinks she is drinking about 4 cups of coffee a day.  She is also trying to drink more water.  No other obvious food or environmental changes that could account for her symptoms..    Patient Active Problem List   Diagnosis     Atypical bipolar affective disorder     Abnormal involuntary movements     Nonspecific abnormal finding     Insomnia     Cervicalgia     Family history of diabetes mellitus     Onychauxis     Chronic schizophrenia     Low back pain       Current Outpatient Prescriptions:      benztropine (COGENTIN) 0.5 MG tablet, Take 0.5 mg by mouth 2 (two) times a day. , Disp: , Rfl:      divalproex (DEPAKOTE) 500 MG EC tablet, Take 1,000 mg by mouth once daily. , Disp: , Rfl:      escitalopram oxalate (LEXAPRO) 20 MG tablet, Take 30 mg by mouth bedtime. , Disp: , Rfl:      risperiDONE (RISPERDAL) 4 MG tablet, Take by mouth bedtime., Disp: , Rfl:      traZODone (DESYREL) 150 MG tablet, Take 150 mg by mouth bedtime as needed. , Disp: , Rfl:      zolpidem (AMBIEN) 10 mg tablet, Take by mouth bedtime., Disp: , Rfl:     Objective:  Allergies:  Allergies   Allergen Reactions     Gas Enzyme Supplement [Alpha-D-Galactosidase]      Melon Flavor      Ragweed      Trazodone Other (See Comments)     heart burn       Vitals:    02/23/18 1542    BP: 120/60   Pulse: 80   Resp: 16     Body mass index is 27.85 kg/(m^2).    Vital signs reviewed  General: Patient is alert and oriented x 3, in no apparent distress  Cardiac: Regular rate and rhythm, no murmurs  Lungs: Clear to auscultation bilaterally  Abdomen: Non tender to palpation, no hepatosplenomegaly, negative Nguyen's sign, no pain over McBurney's point, positive bowel sounds, no masses palpable, no CVA tenderness bilaterally    Results for orders placed or performed in visit on 02/23/18   Urinalysis   Result Value Ref Range    Color, UA Yellow Colorless, Yellow, Straw, Light Yellow    Clarity, UA Clear Clear    Glucose, UA Negative Negative    Bilirubin, UA Negative Negative    Ketones, UA Negative Negative    Specific Gravity, UA 1.015 1.005 - 1.030    Blood, UA Negative Negative    pH, UA 7.0 5.0 - 8.0    Protein, UA Negative Negative mg/dL    Urobilinogen, UA 0.2 E.U./dL 0.2 E.U./dL, 1.0 E.U./dL    Nitrite, UA Negative Negative    Leukocytes, UA Negative Negative     Urine culture is pending.    Assessment and Plan:  Urinary frequency and urgency.  It appears that this is not a UTI, given urinalysis results.  I will follow-up with urine culture.  I discussed other causes for her symptoms.  She estimates she drinks 4 cups of coffee with caffeine a day.  I discussed trying to cut down to 3 cups a day and see how that works.  Can continue to monitor. Follow-up as needed.    This dictation uses voice recognition software, which may contain typographical errors.

## 2021-06-18 NOTE — PROGRESS NOTES
Assessment:      Healthy female exam.    1. Routine general medical examination at a health care facility  Wet Prep, Vaginal    Lipid Profile    Thyroid Stimulating Hormone (TSH)   2. Visit for screening mammogram  Mammo Screening Bilateral   3. Screen for colon cancer  Ambulatory referral for Colonoscopy   4. Deformity of left thumb joint  Ambulatory referral to Orthopedics   5. Chronic schizophrenia  Comprehensive Metabolic Panel    HM1(CBC and Differential)    Valproic Acid (Depakene )    HM1 (CBC with Diff)   6. Cervicalgia     7. Atypical squamous cells of undetermined significance on cytologic smear of cervix (ASC-US)  Gynecologic Cytology (PAP Smear)    HPV High Risk DNA Cervical   8. Family history of diabetes mellitus            Plan:      58 yo female here for physical exam.  1.  Health Maintenance - due for pap smear today - done; due for breast cancer screening - referred to mammogram; due for colon cancer screening - colonoscopy referral.   2.  Deformity of thumb joint - unable to fully straighten thumb joint - concern for tendon injury - refer to Ortho  3.  Chronic schizophrenia - check labs  4.  Neck pain/cervicalgia - stable  5.  Fam hx diabetes - will check blood sugar    Subjective:      Susi Haro is a 57 y.o. female who presents for an annual exam.  The patient reports that there is not domestic violence in her life.     Here for physical exam  Still living in dad's house  Not terribly active    Healthy Habits:   Regular Exercise: No  Sunscreen Use: No  Healthy Diet: trying  Dental Visits Regularly: Yes  Seat Belt: Yes  Sexually active: No  Self Breast Exam Monthly:not regularly  Hemoccults: no  Flex Sig: No  Colonoscopy: No and declines - again - will continue to encourage      Immunization History   Administered Date(s) Administered     Tdap 08/21/2012     Immunization status: reviewed.    No exam data present    Gynecologic History  Patient's last menstrual period was  10/01/2014.  Contraception: none  Last Pap: 2017. Results were: abnormal  Last mammogram: 16. Results were: normal      OB History    Para Term  AB Living   0 0 0 0 0 0   SAB TAB Ectopic Multiple Live Births   0 0 0 0              Current Outpatient Prescriptions   Medication Sig Dispense Refill     benztropine (COGENTIN) 0.5 MG tablet Take 0.5 mg by mouth 2 (two) times a day.        divalproex (DEPAKOTE) 500 MG EC tablet Take 1,000 mg by mouth once daily.        escitalopram oxalate (LEXAPRO) 20 MG tablet Take 30 mg by mouth bedtime.        risperiDONE (RISPERDAL) 4 MG tablet Take by mouth bedtime.       traZODone (DESYREL) 150 MG tablet Take 150 mg by mouth bedtime as needed.        zolpidem (AMBIEN) 10 mg tablet Take by mouth bedtime.       No current facility-administered medications for this visit.      Past Medical History:   Diagnosis Date     Failed bunionectomy      Past Surgical History:   Procedure Laterality Date     BUNIONECTOMY Left 2014    [facility] -      Gas enzyme supplement [alpha-d-galactosidase]; Melon flavor; Ragweed; and Trazodone  Family History   Problem Relation Age of Onset     Breast cancer Mother       age 72     Bipolar disorder Mother      and niece     Diabetes Father      Pulmonary embolism Father      Diabetes Paternal Grandmother      Diabetes Paternal Grandfather      Hypertension Brother      Sleep apnea Brother      Social History     Social History     Marital status: Single     Spouse name: N/A     Number of children: N/A     Years of education: N/A     Occupational History     permanent disability Disabled     mental health; neck, foot     Social History Main Topics     Smoking status: Former Smoker     Types: Cigarettes     Smokeless tobacco: Never Used      Comment: stopped at age 18     Alcohol use No      Comment: rare use     Drug use: No     Sexual activity: No     Other Topics Concern     Not on file     Social History Narrative     "Lives in own room; in dad's house       Review of Systems  Review of Systems     Pertinent positives as noted in HPI; otherwise 12 point ROS negative.  Has pain in hands - has a trigger finger, has       Objective:         Vitals:    06/06/18 1125   BP: 94/54   Pulse: 76   Resp: 14   Temp: 97  F (36.1  C)   TempSrc: Oral   SpO2: 94%   Weight: 155 lb (70.3 kg)   Height: 5' 3.5\" (1.613 m)     Body mass index is 27.03 kg/(m^2).    Physical  Physical Exam     EXAM:  BP 94/54 (Patient Site: Right Arm, Patient Position: Sitting, Cuff Size: Adult Regular)  Pulse 76  Temp 97  F (36.1  C) (Oral)   Resp 14  Ht 5' 3.5\" (1.613 m)  Wt 155 lb (70.3 kg)  LMP 10/01/2014  SpO2 94%  BMI 27.03 kg/m2   Gen:  NAD, appears well, well-hydrated, involuntary movements (baseline)  HEENT:  TMs nl, oropharynx benign, nasal mucosa nl, conjunctiva clear  Neck:  Supple, no adenopathy, no thyromegaly, no carotid bruits, no JVD  Lungs:  Clear to auscultation bilaterally  Breast exam:  No breast lumps, no skin changes, no nipple discharge, no axillary adenopathy  Cor:  RRR no murmur  Abd:  Soft, nontender, BS+, no masses, no guarding or rebound, no HSM  PELVIC EXAM:External genitalia: normal  Vaginal mucosa normal  Vaginal discharge: white  Speculum exam shows a normal appearing cervix .   Bimanual exam: Cervix closed, firm, non tender  to motion.  Uterus  firm, regular, mobile, non tender to palpation. No adnexal masses or tenderness.   Extr:  Neg.  Neuro:  No asymmetry, involuntary movements (as noted)  Skin:  Warm/dry      "

## 2021-06-19 NOTE — LETTER
Letter by Cara Garcia MD at      Author: Cara Garcia MD Service: -- Author Type: --    Filed:  Encounter Date: 7/22/2019 Status: (Other)         Susi Haro  [address] [facility] [address]  [place] [address]             July 22, 2019         Dear MsTasha Tahir,    Below are the results from your recent visit:    Resulted Orders   Wet Prep, Vaginal   Result Value Ref Range    Yeast Result No yeast seen No yeast seen    Trichomonas No Trichomonas seen No Trichomonas seen    Clue Cells, Wet Prep No Clue cells seen No Clue cells seen       No sign of infection.      Please call with questions or contact us using [employer].    Sincerely,        Electronically signed by Cara Garcia MD

## 2021-06-19 NOTE — LETTER
Letter by Cara Garcia MD at      Author: Cara Garcia MD Service: -- Author Type: --    Filed:  Encounter Date: 7/22/2019 Status: (Other)         [facility]  [address] [facility] [address]  [place] [address]             July 22, 2019         Dear Ms. Haro,    Below are the results from your recent visit:    Resulted Orders   Urinalysis   Result Value Ref Range    Color, UA Yellow Colorless, Yellow, Straw, Light Yellow    Clarity, UA Clear Clear    Glucose, UA Negative Negative    Bilirubin, UA Negative Negative    Ketones, UA Trace (!) Negative    Specific Gravity, UA 1.020 1.005 - 1.030    Blood, UA Negative Negative    pH, UA 6.5 5.0 - 8.0    Protein, UA Negative Negative mg/dL    Urobilinogen, UA 0.2 E.U./dL 0.2 E.U./dL, 1.0 E.U./dL    Nitrite, UA Negative Negative    Leukocytes, UA Negative Negative    Narrative    Microscopic not indicated   Culture, Urine   Result Value Ref Range    Culture No Growth        Your urine culture is negative (no sign of infection).     Please call with questions or contact us using [employer].    Sincerely,        Electronically signed by Cara Garcia MD

## 2021-06-19 NOTE — LETTER
Letter by Cara Garcia MD at      Author: Cara Garcia MD Service: -- Author Type: --    Filed:  Encounter Date: 6/12/2019 Status: (Other)         Susi Haro  [address] [facility] [address]  [place] [address]             June 12, 2019         Dear Ms. Haro,    Below are the results from your recent visit:    Resulted Orders   Hepatitis B Surface Antibody (Anti-HBs) Vaccine Check   Result Value Ref Range    HBV Surface Ab (Vaccine Check) Negative (!) Positive   Hepatitis Acute Evaluation   Result Value Ref Range    Hepatitis A Antibody, IgM Negative Negative    Hepatitis B Core Mariaelena, IgM Negative Negative    Hepatitis B Surface Ag Negative Negative    Hepatitis C Ab Negative Negative   Comprehensive Metabolic Panel   Result Value Ref Range    Sodium 142 136 - 145 mmol/L    Potassium 4.2 3.5 - 5.0 mmol/L    Chloride 108 (H) 98 - 107 mmol/L    CO2 23 22 - 31 mmol/L    Anion Gap, Calculation 11 5 - 18 mmol/L    Glucose 89 70 - 125 mg/dL    BUN 14 8 - 22 mg/dL    Creatinine 0.70 0.60 - 1.10 mg/dL    GFR MDRD Af Amer >60 >60 mL/min/1.73m2    GFR MDRD Non Af Amer >60 >60 mL/min/1.73m2    Bilirubin, Total 0.5 0.0 - 1.0 mg/dL    Calcium 9.6 8.5 - 10.5 mg/dL    Protein, Total 7.1 6.0 - 8.0 g/dL    Albumin 4.0 3.5 - 5.0 g/dL    Alkaline Phosphatase 77 45 - 120 U/L    AST 20 0 - 40 U/L    ALT 17 0 - 45 U/L    Narrative    Fasting Glucose reference range is 70-99 mg/dL per  American Diabetes Association (ADA) guidelines.   Lipid Profile   Result Value Ref Range    Triglycerides 63 <=149 mg/dL    Cholesterol 203 (H) <=199 mg/dL    LDL Calculated 113 <=129 mg/dL    HDL Cholesterol 77 >=50 mg/dL    Patient Fasting > 8hrs? Yes    Vitamin D, Total (25-Hydroxy)   Result Value Ref Range    Vitamin D, Total (25-Hydroxy) 31.7 30.0 - 80.0 ng/mL    Narrative    Deficiency <10.0 ng/mL  Insufficiency 10.0-29.9 ng/mL  Sufficiency 30.0-80.0 ng/mL  Toxicity (possible) >100.0 ng/mL   HM1 (CBC with Diff)   Result  Value Ref Range    WBC 4.9 4.0 - 11.0 thou/uL    RBC 4.12 3.80 - 5.40 mill/uL    Hemoglobin 13.0 12.0 - 16.0 g/dL    Hematocrit 38.4 35.0 - 47.0 %    MCV 93 80 - 100 fL    MCH 31.6 27.0 - 34.0 pg    MCHC 33.9 32.0 - 36.0 g/dL    RDW 12.0 11.0 - 14.5 %    Platelets 334 140 - 440 thou/uL    MPV 7.3 7.0 - 10.0 fL    Neutrophils % 51 50 - 70 %    Lymphocytes % 32 20 - 40 %    Monocytes % 8 2 - 10 %    Eosinophils % 8 (H) 0 - 6 %    Basophils % 1 0 - 2 %    Neutrophils Absolute 2.5 2.0 - 7.7 thou/uL    Lymphocytes Absolute 1.6 0.8 - 4.4 thou/uL    Monocytes Absolute 0.4 0.0 - 0.9 thou/uL    Eosinophils Absolute 0.4 0.0 - 0.4 thou/uL    Basophils Absolute 0.0 0.0 - 0.2 thou/uL       Your labs look pretty good.  No sign of hepatitis infection.  The hepatitis B vaccine check tells us that you are not immune to hepatitis B - if you wanted to get the vaccination, you could do that (it is a 3 shot series, one now, one in a month, one in 6 months).      Please call with questions or contact us using [employer].    Sincerely,        Electronically signed by Cara Garcia MD

## 2021-06-20 NOTE — LETTER
Letter by Cara Garcia MD at      Author: Cara Garcia MD Service: -- Author Type: --    Filed:  Encounter Date: 9/16/2020 Status: (Other)         09/16/20    RE: Susi Haro  1960    Dear Susi    It has been brought to my attention that you have not completed your Cologuard testing.  This is an important part of health maintenance - one way to screen for colon cancer.  If you would like to consider a different type of screening - colonoscopy or hemoccult cards - please let me know.  Otherwise, please consider completing the Cologuard testing as soon as it is convenient for you.    Sincerely,        Cara Garcia MD      Thank you for your attention to this matter.     Sincerely,        Cara Garcia MD

## 2021-06-20 NOTE — LETTER
Letter by Cara Garcia MD at      Author: Cara Garcia MD Service: -- Author Type: --    Filed:  Encounter Date: 9/16/2020 Status: (Other)         09/16/20    RE: Susi Haro  1960    Dear Susi    It has been brought to my attention that you have not completed your Cologuard testing.  This is an important part of health maintenance - one way to screen for colon cancer.  If you would like to consider a different type of screening - colonoscopy or hemoccult cards - please let me know.  Otherwise, please consider completing the Cologuard testing as soon as it is convenient for you.    Sincerely,        Cara Garcia MD

## 2021-06-20 NOTE — LETTER
Letter by Cara Garcia MD at      Author: Cara Garcia MD Service: -- Author Type: --    Filed:  Encounter Date: 7/20/2020 Status: (Other)         Susi Haro  [address]  [place] [address]             July 20, 2020         Dear MsTasha Tahir,    Below are the results from your recent visit:    Resulted Orders   Glycosylated Hemoglobin A1c   Result Value Ref Range    Hemoglobin A1c 5.5 3.5 - 6.0 %   HIV Antigen/Antibody Screening Cascade   Result Value Ref Range    HIV Antigen / Antibody Negative Negative    Narrative    Method is Abbott HIV Ag/Ab for the detection of HIV p24 antigen, HIV-1 antibodies and HIV-2 antibodies.       Your labs are normal.     Please call with questions or contact us using [employer].    Sincerely,        Electronically signed by Cara Garcia MD

## 2021-07-03 NOTE — ADDENDUM NOTE
Addendum Note by Cara Garcia MD at 10/9/2019  2:52 PM     Author: Cara aGrcia MD Service: -- Author Type: Physician    Filed: 10/9/2019  2:52 PM Encounter Date: 10/9/2019 Status: Signed    : Cara Garcia MD (Physician)    Addended by: CARA GARCIA on: 10/9/2019 02:52 PM        Modules accepted: Orders

## 2021-07-16 ENCOUNTER — OFFICE VISIT (OUTPATIENT)
Dept: FAMILY MEDICINE | Facility: CLINIC | Age: 61
End: 2021-07-16
Payer: MEDICAID

## 2021-07-16 ENCOUNTER — ANCILLARY PROCEDURE (OUTPATIENT)
Dept: MAMMOGRAPHY | Facility: CLINIC | Age: 61
End: 2021-07-16
Attending: FAMILY MEDICINE
Payer: MEDICAID

## 2021-07-16 VITALS
OXYGEN SATURATION: 100 % | SYSTOLIC BLOOD PRESSURE: 109 MMHG | HEIGHT: 63 IN | HEART RATE: 56 BPM | RESPIRATION RATE: 16 BRPM | DIASTOLIC BLOOD PRESSURE: 67 MMHG | WEIGHT: 166.25 LBS | BODY MASS INDEX: 29.46 KG/M2 | TEMPERATURE: 97.1 F

## 2021-07-16 DIAGNOSIS — L84 CORN OR CALLUS: ICD-10-CM

## 2021-07-16 DIAGNOSIS — Z12.31 VISIT FOR SCREENING MAMMOGRAM: ICD-10-CM

## 2021-07-16 DIAGNOSIS — Z00.00 ENCOUNTER FOR MEDICARE ANNUAL WELLNESS EXAM: Primary | ICD-10-CM

## 2021-07-16 DIAGNOSIS — G89.29 CHRONIC BILATERAL LOW BACK PAIN WITHOUT SCIATICA: ICD-10-CM

## 2021-07-16 DIAGNOSIS — Z11.59 ENCOUNTER FOR HCV SCREENING TEST FOR LOW RISK PATIENT: ICD-10-CM

## 2021-07-16 DIAGNOSIS — M54.50 CHRONIC BILATERAL LOW BACK PAIN WITHOUT SCIATICA: ICD-10-CM

## 2021-07-16 DIAGNOSIS — Z83.3 FAMILY HISTORY OF DIABETES MELLITUS: ICD-10-CM

## 2021-07-16 DIAGNOSIS — R25.9 ABNORMAL INVOLUNTARY MOVEMENTS: ICD-10-CM

## 2021-07-16 DIAGNOSIS — F20.9 CHRONIC SCHIZOPHRENIA (H): ICD-10-CM

## 2021-07-16 DIAGNOSIS — Z23 NEED FOR SHINGLES VACCINE: ICD-10-CM

## 2021-07-16 DIAGNOSIS — F31.89 ATYPICAL BIPOLAR AFFECTIVE DISORDER (H): ICD-10-CM

## 2021-07-16 LAB
ALBUMIN SERPL-MCNC: 4.1 G/DL (ref 3.5–5)
ALP SERPL-CCNC: 97 U/L (ref 45–120)
ALT SERPL W P-5'-P-CCNC: 13 U/L (ref 0–45)
ANION GAP SERPL CALCULATED.3IONS-SCNC: 14 MMOL/L (ref 5–18)
AST SERPL W P-5'-P-CCNC: 14 U/L (ref 0–40)
BILIRUB SERPL-MCNC: 0.4 MG/DL (ref 0–1)
BUN SERPL-MCNC: 13 MG/DL (ref 8–22)
CALCIUM SERPL-MCNC: 9.4 MG/DL (ref 8.5–10.5)
CHLORIDE BLD-SCNC: 103 MMOL/L (ref 98–107)
CHOLEST SERPL-MCNC: 221 MG/DL
CO2 SERPL-SCNC: 24 MMOL/L (ref 22–31)
CREAT SERPL-MCNC: 0.7 MG/DL (ref 0.6–1.1)
FASTING STATUS PATIENT QL REPORTED: YES
GFR SERPL CREATININE-BSD FRML MDRD: >90 ML/MIN/1.73M2
GLUCOSE BLD-MCNC: 104 MG/DL (ref 70–125)
HBA1C MFR BLD: 5.7 % (ref 0–5.6)
HDLC SERPL-MCNC: 60 MG/DL
LDLC SERPL CALC-MCNC: 140 MG/DL
POTASSIUM BLD-SCNC: 4 MMOL/L (ref 3.5–5)
PROT SERPL-MCNC: 7.5 G/DL (ref 6–8)
SODIUM SERPL-SCNC: 141 MMOL/L (ref 136–145)
TRIGL SERPL-MCNC: 107 MG/DL

## 2021-07-16 PROCEDURE — 77067 SCR MAMMO BI INCL CAD: CPT | Mod: TC | Performed by: RADIOLOGY

## 2021-07-16 PROCEDURE — 90750 HZV VACC RECOMBINANT IM: CPT | Performed by: FAMILY MEDICINE

## 2021-07-16 PROCEDURE — 83036 HEMOGLOBIN GLYCOSYLATED A1C: CPT | Performed by: FAMILY MEDICINE

## 2021-07-16 PROCEDURE — 36415 COLL VENOUS BLD VENIPUNCTURE: CPT | Performed by: FAMILY MEDICINE

## 2021-07-16 PROCEDURE — 86803 HEPATITIS C AB TEST: CPT | Performed by: FAMILY MEDICINE

## 2021-07-16 PROCEDURE — 99396 PREV VISIT EST AGE 40-64: CPT | Mod: 25 | Performed by: FAMILY MEDICINE

## 2021-07-16 PROCEDURE — 80061 LIPID PANEL: CPT | Performed by: FAMILY MEDICINE

## 2021-07-16 PROCEDURE — 80053 COMPREHEN METABOLIC PANEL: CPT | Performed by: FAMILY MEDICINE

## 2021-07-16 PROCEDURE — 90471 IMMUNIZATION ADMIN: CPT | Performed by: FAMILY MEDICINE

## 2021-07-16 RX ORDER — LANOLIN ALCOHOL/MO/W.PET/CERES
CREAM (GRAM) TOPICAL
COMMUNITY
Start: 2021-06-21 | End: 2023-02-06

## 2021-07-16 RX ORDER — MULTIVIT-MIN/IRON FUM/FOLIC AC 7.5 MG-4
1 TABLET ORAL
COMMUNITY
Start: 2020-04-13 | End: 2021-07-19

## 2021-07-16 ASSESSMENT — ENCOUNTER SYMPTOMS
NAUSEA: 0
DIARRHEA: 0
PALPITATIONS: 0
SHORTNESS OF BREATH: 0
ARTHRALGIAS: 0
BREAST MASS: 0
EYE PAIN: 0
DYSURIA: 0
DIZZINESS: 0
HEMATURIA: 0
CONSTIPATION: 0
ABDOMINAL PAIN: 0
JOINT SWELLING: 0
SORE THROAT: 0
CHILLS: 0
FREQUENCY: 0
HEADACHES: 0
NERVOUS/ANXIOUS: 0
HEARTBURN: 0
WEAKNESS: 0
FEVER: 0
PARESTHESIAS: 0
COUGH: 0
HEMATOCHEZIA: 0
MYALGIAS: 0

## 2021-07-16 ASSESSMENT — MIFFLIN-ST. JEOR: SCORE: 1298.24

## 2021-07-16 NOTE — LETTER
July 28, 2021      Susi Haro  [address]  [place] [address]        Dear ,    We are writing to inform you of your test results.    Your labs show:  1.  No sign of hepatitis C - this is good!  2.  A1c shows possible borderline early pre-diabetes.  Nothing to do right now - just watch diet (food choices) and get some exercise.  We'll keep watching this.   3.  Your choelsterol numbers are a little bit high - we may need to consider cholesterol medications in the future.  4.  Kidney, liver, blood sugars look okay today.      Resulted Orders   Hepatitis C antibody   Result Value Ref Range    Hepatitis C Antibody Negative Negative    Narrative    Assay performance characteristics have not been established for newborns, infants, children (<18 years) or populations of immunocompromised or immunosuppressed patients.    Hemoglobin A1c   Result Value Ref Range    Hemoglobin A1C 5.7 (H) 0.0 - 5.6 %      Comment:      Normal <5.7%   Prediabetes 5.7-6.4%    Diabetes 6.5% or higher     Note: Adopted from ADA consensus guidelines.   Lipid Profile (Chol, Trig, HDL, LDL calc)   Result Value Ref Range    Cholesterol 221 (H) <=199 mg/dL    Triglycerides 107 <=149 mg/dL    Direct Measure HDL 60 >=50 mg/dL      Comment:      HDL Cholesterol Reference Range:     0-2 years:   No reference ranges established for patients under 2 years old  at [facility] for lipid analytes.    2-8 years:  Greater than 45 mg/dL     18 years and older:   Female: Greater than or equal to 50 mg/dL   Male:   Greater than or equal to 40 mg/dL    LDL Cholesterol Calculated 140 (H) <=129 mg/dL    Patient Fasting > 8hrs? Yes    Comprehensive metabolic panel (BMP + Alb, Alk Phos, ALT, AST, Total. Bili, TP)   Result Value Ref Range    Sodium 141 136 - 145 mmol/L    Potassium 4.0 3.5 - 5.0 mmol/L    Chloride 103 98 - 107 mmol/L    Carbon Dioxide (CO2) 24 22 - 31 mmol/L    Anion Gap 14 5 - 18 mmol/L    Urea Nitrogen 13 8 - 22  mg/dL    Creatinine 0.70 0.60 - 1.10 mg/dL    Calcium 9.4 8.5 - 10.5 mg/dL    Glucose 104 70 - 125 mg/dL    Alkaline Phosphatase 97 45 - 120 U/L    AST 14 0 - 40 U/L    ALT 13 0 - 45 U/L    Protein Total 7.5 6.0 - 8.0 g/dL    Albumin 4.1 3.5 - 5.0 g/dL    Bilirubin Total 0.4 0.0 - 1.0 mg/dL    GFR Estimate >90 >60 mL/min/1.73m2      Comment:      As of July 11, 2021, eGFR is calculated by the CKD-EPI creatinine equation, without race adjustment. eGFR can be influenced by muscle mass, exercise, and diet. The reported eGFR is an estimation only and is only applicable if the renal function is stable.       If you have any questions or concerns, please call the clinic at the number listed above.       Sincerely,      Cara Garcia MD

## 2021-07-16 NOTE — PROGRESS NOTES
"   SUBJECTIVE:   CC: Susi Haro is an 61 year old woman who presents for preventive health visit.       Patient has been advised of split billing requirements and indicates understanding: Yes  Healthy Habits:     Getting at least 3 servings of Calcium per day:  Yes    Bi-annual eye exam:  Yes    Dental care twice a year:  Yes    Sleep apnea or symptoms of sleep apnea:  None    Diet:  Regular (no restrictions)    Frequency of exercise:  6-7 days/week    Duration of exercise:  45-60 minutes    Taking medications regularly:  Yes    Medication side effects:  Not applicable and None    PHQ-2 Total Score: 2    Additional concerns today:  Yes    Ability to successfully perform activities of daily living: Yes, no assistance needed  Home safety:  none identified   Hearing impairment: NO -  No difficulty with hearing     Has a \"thing\" on her right foot - small toe -     Never had COVID  Had COVID vaccine - J&J 4/8/2021    Taking Risperidone, Metformin - zolpidem    In last year:  Lost dad (age 88) - had diabetes;  and brother (age 61) - doing dialysis due to diabetes    Gets disability for mental health - bipolar/schizophrenia        Today's PHQ-2 Score:   PHQ-2 ( 1999 Pfizer) 7/16/2021   Q1: Little interest or pleasure in doing things 1   Q2: Feeling down, depressed or hopeless 1   PHQ-2 Score 2   Q1: Little interest or pleasure in doing things Several days   Q2: Feeling down, depressed or hopeless Several days   PHQ-2 Score 2       Abuse: Current or Past (Physical, Sexual or Emotional) - No  Do you feel safe in your environment? Yes    Have you ever done Advance Care Planning? (For example, a Health Directive, POLST, or a discussion with a medical provider or your loved ones about your wishes): No, advance care planning information given to patient to review.  Advanced care planning was discussed at today's visit.    Social History     Tobacco Use     Smoking status: Never Smoker     Smokeless tobacco: Never Used "   Substance Use Topics     Alcohol use: Not on file         Alcohol Use 2021   Prescreen: >3 drinks/day or >7 drinks/week? No       Reviewed orders with patient.  Reviewed health maintenance and updated orders accordingly - Yes  Lab work is in process  Labs reviewed in EPIC  BP Readings from Last 3 Encounters:   21 109/67   21 126/78   21 124/78    Wt Readings from Last 3 Encounters:   21 75.4 kg (166 lb 4 oz)   21 78.8 kg (173 lb 11.2 oz)   21 79.4 kg (175 lb)                  Patient Active Problem List   Diagnosis     Abnormal finding     Abnormal involuntary movements     Akathisia     Atypical squamous cells of undetermined significance on cytologic smear of cervix (ASC-US)     Cervicalgia     Chronic schizophrenia (H)     Deformity of left thumb joint     Family history of diabetes mellitus     Insomnia     Low back pain     Low grade squamous intraepithelial lesion (LGSIL) on Papanicolaou smear of cervix     Neuroleptic-induced tardive dyskinesia     Onychauxis     Atypical bipolar affective disorder (H)     Past Surgical History:   Procedure Laterality Date     BUNIONECTOMY Left 2014    [facility] -        Social History     Tobacco Use     Smoking status: Never Smoker     Smokeless tobacco: Never Used   Substance Use Topics     Alcohol use: Not on file     Family History   Problem Relation Age of Onset     Breast Cancer Mother          age 72     Bipolar Disorder Mother         and niece     Diabetes Father      Pulmonary Embolism Father      Diabetes Paternal Grandmother      Diabetes Paternal Grandfather      Hypertension Brother      Sleep Apnea Brother          Current Outpatient Medications   Medication Sig Dispense Refill     Foot Care Products (CORN CUSHIONS) PADS Apply topically to small toe (right foot) for corn 18 each 3     melatonin 3 MG tablet        metFORMIN (GLUCOPHAGE) 500 MG tablet        multivitamins w/minerals tablet Take 1 tablet  by mouth       risperiDONE (RISPERDAL) 3 MG tablet        zolpidem (AMBIEN) 5 MG tablet        benztropine (COGENTIN) 1 MG tablet  (Patient not taking: Reported on 7/16/2021)       Allergies   Allergen Reactions     Lactase      Melon      Trazodone Other (See Comments)     heart burn  heart burn, PN: heart burn       Recent Labs   Lab Test 04/14/21  1055 04/13/21  1527 07/15/20  1539 06/10/19  1022 06/06/18  1220 04/07/17  1049   A1C  --  5.6 5.5  --   --  5.8   LDL  --   --   --  113 109 107   HDL  --   --   --  77 77 70   TRIG  --   --   --  63 70 63   ALT 20  --   --  17 14  --    CR 0.72  --   --  0.70 0.70  --    GFRESTIMATED >60  --   --  >60 >60  --    GFRESTBLACK >60  --   --  >60 >60  --    POTASSIUM 4.3  --   --  4.2 4.5  --    TSH  --   --   --   --  1.96  --         Breast Cancer Screening:  Any new diagnosis of family breast, ovarian, or bowel cancer? No    FHS-7: No flowsheet data found.    Mammogram Screening: Recommended mammography every 1-2 years with patient discussion and risk factor consideration  Pertinent mammograms are reviewed under the imaging tab.    History of abnormal Pap smear: done       Reviewed and updated as needed this visit by clinical staff  Tobacco  Allergies               Reviewed and updated as needed this visit by Provider                Past Medical History:   Diagnosis Date     Failed bunionectomy       Past Surgical History:   Procedure Laterality Date     BUNIONECTOMY Left July 8, 2014    [facility] -      OB History   No obstetric history on file.       Review of Systems   Constitutional: Negative for chills and fever.   HENT: Negative for congestion, ear pain, hearing loss and sore throat.    Eyes: Negative for pain and visual disturbance.   Respiratory: Negative for cough and shortness of breath.    Cardiovascular: Negative for chest pain, palpitations and peripheral edema.   Gastrointestinal: Negative for abdominal pain, constipation, diarrhea, heartburn,  "hematochezia and nausea.   Breasts:  Negative for tenderness, breast mass and discharge.   Genitourinary: Negative for dysuria, frequency, genital sores, hematuria, pelvic pain, urgency, vaginal bleeding and vaginal discharge.   Musculoskeletal: Negative for arthralgias, joint swelling and myalgias.   Skin: Negative for rash.        Sore on medial aspect of inner right small toe    Neurological: Negative for dizziness, weakness, headaches and paresthesias.   Psychiatric/Behavioral: Negative for mood changes. The patient is not nervous/anxious.           OBJECTIVE:   /67 (BP Location: Right arm, Patient Position: Sitting, Cuff Size: Adult Large)   Pulse 56   Temp 97.1  F (36.2  C) (Temporal)   Resp 16   Ht 1.6 m (5' 3\")   Wt 75.4 kg (166 lb 4 oz)   SpO2 100%   BMI 29.45 kg/m    Physical Exam  Vitals reviewed.   Constitutional:       General: She is not in acute distress.     Appearance: Normal appearance.   HENT:      Head: Normocephalic.      Right Ear: Tympanic membrane, ear canal and external ear normal.      Left Ear: Tympanic membrane, ear canal and external ear normal.      Nose: Nose normal.      Mouth/Throat:      Mouth: Mucous membranes are moist.      Pharynx: No posterior oropharyngeal erythema.   Eyes:      Extraocular Movements: Extraocular movements intact.      Conjunctiva/sclera: Conjunctivae normal.      Pupils: Pupils are equal, round, and reactive to light.   Cardiovascular:      Rate and Rhythm: Normal rate and regular rhythm.      Pulses: Normal pulses.      Heart sounds: Normal heart sounds. No murmur heard.     Pulmonary:      Effort: Pulmonary effort is normal.      Breath sounds: Normal breath sounds.   Chest:      Breasts:         Right: No inverted nipple, mass, nipple discharge or skin change.         Left: No inverted nipple, mass, nipple discharge or skin change.   Abdominal:      Palpations: Abdomen is soft. There is no mass.      Tenderness: There is no abdominal " tenderness. There is no guarding or rebound.   Genitourinary:     Comments: External genitalia normal  Musculoskeletal:         General: No deformity. Normal range of motion.      Cervical back: Normal range of motion and neck supple.   Lymphadenopathy:      Cervical: No cervical adenopathy.   Skin:     General: Skin is warm and dry.      Comments: Open area 1 mm shallow with surrounding moist/white skin on medial aspect of right small toe (consistent with a corn)   Neurological:      General: No focal deficit present.      Mental Status: She is alert.      Comments: Dyskinetic movement   Psychiatric:         Mood and Affect: Mood normal.         Behavior: Behavior normal.           Diagnostic Test Results:  Labs reviewed in Epic  No results found for this or any previous visit (from the past 24 hour(s)).    ASSESSMENT/PLAN:   Susi was seen today for physical and derm problem.    Diagnoses and all orders for this visit:    Encounter for Medicare annual wellness exam    Visit for screening mammogram  -     *MA Screening Digital Bilateral; Future    Encounter for HCV screening test for low risk patient  -     Hepatitis C antibody; Future    Abnormal involuntary movements    Chronic schizophrenia (H)  -     Comprehensive metabolic panel (BMP + Alb, Alk Phos, ALT, AST, Total. Bili, TP); Future    Atypical bipolar affective disorder (H)    Family history of diabetes mellitus  -     Hemoglobin A1c; Future  -     Lipid Profile (Chol, Trig, HDL, LDL calc); Future    Chronic bilateral low back pain without sciatica    Need for shingles vaccine  -     ZOSTER VACCINE RECOMBINANT ADJUVANTED (SHINGRIX)    Corn or callus  -     Foot Care Products (CORN CUSHIONS) PADS; Apply topically to small toe (right foot) for corn    We called her health program today during her visit to acknowledge coverage of the shingles vaccine.      Patient has been advised of split billing requirements and indicates understanding:  "Yes  COUNSELING:  Reviewed preventive health counseling, as reflected in patient instructions    Estimated body mass index is 29.45 kg/m  as calculated from the following:    Height as of this encounter: 1.6 m (5' 3\").    Weight as of this encounter: 75.4 kg (166 lb 4 oz).    Weight management plan: Discussed healthy diet and exercise guidelines    She reports that she has never smoked. She has never used smokeless tobacco.      Counseling Resources:  ATP IV Guidelines  Pooled Cohorts Equation Calculator  Breast Cancer Risk Calculator  BRCA-Related Cancer Risk Assessment: FHS-7 Tool  FRAX Risk Assessment  ICSI Preventive Guidelines  Dietary Guidelines for Americans, 2010  USDA's MyPlate  ASA Prophylaxis  Lung CA Screening    DOV ESPITIA MD  [facility]  "

## 2021-07-19 DIAGNOSIS — Z00.00 HEALTH CARE MAINTENANCE: Primary | ICD-10-CM

## 2021-07-19 LAB — HCV AB SERPL QL IA: NEGATIVE

## 2021-07-19 NOTE — TELEPHONE ENCOUNTER
Pending Prescriptions:                       Disp   Refills    multivitamins w/minerals tablet           90 tab*0            Sig: Take 1 tablet by mouth daily

## 2021-07-24 RX ORDER — MULTIPLE VITAMINS W/ MINERALS TAB 9MG-400MCG
1 TAB ORAL DAILY
Qty: 90 TABLET | Refills: 2 | Status: SHIPPED | OUTPATIENT
Start: 2021-07-24 | End: 2022-04-20

## 2021-07-24 NOTE — TELEPHONE ENCOUNTER
"Last Written Prescription Date:  7/16/21  Last Fill Quantity: ?,  # refills: ?   Last office visit provider:  4/14/21     Requested Prescriptions   Pending Prescriptions Disp Refills     multivitamin w/minerals (MULTIVITAMINS W/MINERALS) tablet 90 tablet 0     Sig: Take 1 tablet by mouth daily       Vitamin Supplements (Adult) Protocol Passed - 7/21/2021  1:08 PM        Passed - High dose Vitamin D not ordered        Passed - Recent (12 mo) or future (30 days) visit within the authorizing provider's specialty     Patient has had an office visit with the authorizing provider or a provider within the authorizing providers department within the previous 12 mos or has a future within next 30 days. See \"Patient Info\" tab in inbasket, or \"Choose Columns\" in Meds & Orders section of the refill encounter.              Passed - Medication is active on med list             jean maxwell RN 07/24/21 10:20 AM  "

## 2021-07-27 ENCOUNTER — TELEPHONE (OUTPATIENT)
Dept: FAMILY MEDICINE | Facility: CLINIC | Age: 61
End: 2021-07-27

## 2021-07-27 NOTE — TELEPHONE ENCOUNTER
Reason for Call:  Request for results:    Name of test or procedure: Lab results    Date of test of procedure: 07/16/2021    Location of the test or procedure: [facility]    OK to leave the result message on voice mail or with a family member? YES    Phone number Patient can be reached at:  Home number on file [phone] (home)    Additional comments: Patient called to request a call to go over lab results. Please call patient.     Call taken on 7/27/2021 at 4:12 PM by Edmar Ramirez

## 2021-07-28 NOTE — TELEPHONE ENCOUNTER
I have sent her a letter via [employer].  If she is unable to access that - you can print it and send to her.  And - read her the information from that letter.  In general, labs are okay.  Her A1c suggests she is very early pre-diabetic - just watch diet and exercise .  This will help her cholesterol as well!

## 2021-08-16 ENCOUNTER — MEDICAL CORRESPONDENCE (OUTPATIENT)
Dept: HEALTH INFORMATION MANAGEMENT | Facility: CLINIC | Age: 61
End: 2021-08-16

## 2021-09-15 ENCOUNTER — OFFICE VISIT (OUTPATIENT)
Dept: URGENT CARE | Facility: URGENT CARE | Age: 61
End: 2021-09-15
Payer: MEDICAID

## 2021-09-15 VITALS
WEIGHT: 157 LBS | HEIGHT: 63 IN | TEMPERATURE: 98.2 F | HEART RATE: 76 BPM | DIASTOLIC BLOOD PRESSURE: 80 MMHG | OXYGEN SATURATION: 96 % | BODY MASS INDEX: 27.82 KG/M2 | SYSTOLIC BLOOD PRESSURE: 124 MMHG

## 2021-09-15 DIAGNOSIS — N39.0 URINARY TRACT INFECTION WITHOUT HEMATURIA, SITE UNSPECIFIED: Primary | ICD-10-CM

## 2021-09-15 DIAGNOSIS — R30.0 DYSURIA: ICD-10-CM

## 2021-09-15 LAB
ALBUMIN UR-MCNC: NEGATIVE MG/DL
APPEARANCE UR: CLEAR
BILIRUB UR QL STRIP: NEGATIVE
COLOR UR AUTO: YELLOW
GLUCOSE UR STRIP-MCNC: NEGATIVE MG/DL
HGB UR QL STRIP: NEGATIVE
KETONES UR STRIP-MCNC: NEGATIVE MG/DL
LEUKOCYTE ESTERASE UR QL STRIP: NEGATIVE
NITRATE UR QL: NEGATIVE
PH UR STRIP: 5.5 [PH] (ref 5–7)
SP GR UR STRIP: 1.02 (ref 1–1.03)
UROBILINOGEN UR STRIP-ACNC: 0.2 E.U./DL

## 2021-09-15 PROCEDURE — 87086 URINE CULTURE/COLONY COUNT: CPT | Performed by: FAMILY MEDICINE

## 2021-09-15 PROCEDURE — 99214 OFFICE O/P EST MOD 30 MIN: CPT | Performed by: FAMILY MEDICINE

## 2021-09-15 PROCEDURE — 81003 URINALYSIS AUTO W/O SCOPE: CPT | Performed by: FAMILY MEDICINE

## 2021-09-15 RX ORDER — CEPHALEXIN 500 MG/1
500 CAPSULE ORAL 2 TIMES DAILY
Qty: 20 CAPSULE | Refills: 0 | Status: SHIPPED | OUTPATIENT
Start: 2021-09-15 | End: 2021-09-25

## 2021-09-15 ASSESSMENT — MIFFLIN-ST. JEOR: SCORE: 1256.28

## 2021-09-15 NOTE — PROGRESS NOTES
Patient presents with:  Urgent Care  Dysuria: Reports dehydration and frequent urination in past few days.  Borderline diabetic.       Subjective     Susi Haro is a 61 year old female who presents to clinic today for the following health issues:    HPI     URINARY TRACT SYMPTOMS      Duration: 3 days     Description  frequency, urgency, nocturia, hesitancy and retention   NO hematuria, odor, incontinence, back pain, vaginal discharge or kidney stone    Intensity:  moderate    Accompanying signs and symptoms:  Fever/chills: no   Flank pain no   Nausea and vomiting: no   Vaginal symptoms: none  Abdominal/Pelvic Pain: no     History  History of frequent UTI's:  Couple a year   History of kidney stones: no   Sexually Active: no   Possibility of pregnancy: No    Precipitating or alleviating factors: None-    Therapies tried and outcome: increase fluid intake, ibuprofen and Tylenol   Outcome: no better         Past Medical History:   Diagnosis Date     Failed bunionectomy      Social History     Tobacco Use     Smoking status: Never Smoker     Smokeless tobacco: Never Used   Substance Use Topics     Alcohol use: Not on file       Current Outpatient Medications   Medication Sig Dispense Refill     benztropine (COGENTIN) 1 MG tablet        cephALEXin (KEFLEX) 500 MG capsule Take 1 capsule (500 mg) by mouth 2 times daily for 10 days 20 capsule 0     melatonin 3 MG tablet        metFORMIN (GLUCOPHAGE) 500 MG tablet        multivitamin w/minerals (MULTIVITAMINS W/MINERALS) tablet Take 1 tablet by mouth daily 90 tablet 2     risperiDONE (RISPERDAL) 3 MG tablet        zolpidem (AMBIEN) 5 MG tablet        Foot Care Products (CORN CUSHIONS) PADS Apply topically to small toe (right foot) for corn (Patient not taking: Reported on 9/15/2021) 18 each 3     Allergies   Allergen Reactions     Lactase      Melon      Ragweeds      sneezing     Trazodone Other (See Comments)     heart burn  heart burn, PN: heart burn    "            ROS are negative, except as otherwise noted HPI      Objective    /80   Pulse 76   Temp 98.2  F (36.8  C) (Oral)   Ht 1.6 m (5' 3\")   Wt 71.2 kg (157 lb)   SpO2 96%   BMI 27.81 kg/m    Body mass index is 27.81 kg/m .  Physical Exam   GENERAL: healthy, alert and no distress  ABDOMEN: soft, nontender, no hepatosplenomegaly, no masses and bowel sounds normal, no CVAT  MS: no gross musculoskeletal defects noted, no edema  NEURO: Normal strength and tone, mentation intact and speech normal      Diagnostic Test Results:  Labs reviewed in Epic  Results for orders placed or performed in visit on 09/15/21   UA Macro with Reflex to Micro and Culture - lab collect     Status: Normal    Specimen: Urine, Midstream   Result Value Ref Range    Color Urine Yellow Colorless, Straw, Light Yellow, Yellow    Appearance Urine Clear Clear    Glucose Urine Negative Negative mg/dL    Bilirubin Urine Negative Negative    Ketones Urine Negative Negative mg/dL    Specific Gravity Urine 1.020 1.003 - 1.035    Blood Urine Negative Negative    pH Urine 5.5 5.0 - 7.0    Protein Albumin Urine Negative Negative mg/dL    Urobilinogen Urine 0.2 0.2, 1.0 E.U./dL    Nitrite Urine Negative Negative    Leukocyte Esterase Urine Negative Negative    Narrative    Microscopic not indicated           ASSESSMENT/PLAN:      ICD-10-CM    1. Urinary tract infection without hematuria, site unspecified  N39.0 cephALEXin (KEFLEX) 500 MG capsule   2. Dysuria  R30.0 UA Macro with Reflex to Micro and Culture - lab collect     UA Macro with Reflex to Micro and Culture - lab collect     Urine Culture Aerobic Bacterial - lab collect             Reviewed medication instructions and side effects. Follow up if experiences side effects.     I reviewed supportive care, otc meds to use if needed, expected course, and signs of concern.  Follow up as needed or if she does not improve within  1-2 days or if worsens in any way.  Reviewed red flag symptoms " and is to go to the ER if experiences any of these.     The use of Dragon/SuVolta dictation services may have been used to construct the content in this note; any grammatical or spelling errors are non-intentional. Please contact the author of this note directly if you are in need of any clarification.        On the day of the encounter, time spend on chart review, patient visit, review of testing, documentation and discussion with other providers was 30 minutes        Patient Instructions     Start cephalexin antiibiotic for urine infection  2 times a day for 10 days    If fever, nausea/vomiting, pain, to ER

## 2021-09-15 NOTE — PATIENT INSTRUCTIONS
Start cephalexin antiibiotic for urine infection  2 times a day for 10 days    If fever, nausea/vomiting, pain, to ER      Patient Education     Urinary Tract Infections in Women  Urinary tract infections (UTIs) are most often caused by bacteria. These bacteria enter the urinary tract. The bacteria may come from inside the body. Or they may travel from the skin outside the rectum or vagina into the urethra. Female anatomy makes it easy for bacteria from the bowel to enter a woman s urinary tract, which is the most common source of UTI. This means women develop UTIs more often than men. Pain in or around the urinary tract is a common UTI symptom. But the only way to know for sure if you have a UTI for the healthcare provider to test your urine. The two tests that may be done are the urinalysis and urine culture.     Types of UTIs    Cystitis. A bladder infection (cystitis) is the most common UTI in women. You may have urgent or frequent need to pee. You may also have pain, burning when you pee, and bloody urine.    Urethritis. This is an inflamed urethra, which is the tube that carries urine from the bladder to outside the body. You may have lower stomach or back pain. You may also have urgent or frequent need to pee.    Pyelonephritis. This is a kidney infection. If not treated, it can be serious and damage your kidneys. In severe cases, you may need to stay in the hospital. You may have a fever and lower back pain.    Medicines to treat a UTI  Most UTIs are treated with antibiotics. These kill the bacteria. The length of time you need to take them depends on the type of infection. It may be as short as 3 days. If you have repeated UTIs, you may need a low-dose antibiotic for several months. Take antibiotics exactly as directed. Don t stop taking them until all of the medicine is gone. If you stop taking the antibiotic too soon, the infection may not go away. You may also develop a resistance to the antibiotic.  This can make it much harder to treat.   Lifestyle changes to treat and prevent UTIs   The lifestyle changes below will help get rid of your UTI. They may also help prevent future UTIs.     Drink plenty of fluids. This includes water, juice, or other caffeine-free drinks. Fluids help flush bacteria out of your body.    Empty your bladder. Always empty your bladder when you feel the urge to pee. And always pee before going to sleep. Urine that stays in your bladder can lead to infection. Try to pee before and after sex as well.    Practice good personal hygiene. Wipe yourself from front to back after using the toilet. This helps keep bacteria from getting into the urethra.    Use condoms during sex. These help prevent UTIs caused by sexually transmitted bacteria. Also don't use spermicides during sex. These can increase the risk for UTIs. Choose other forms of birth control instead. For women who tend to get UTIs after sex, a low-dose of a preventive antibiotic may be used. Be sure to discuss this option with your healthcare provider.    Follow up with your healthcare provider as directed. He or she may test to make sure the infection has cleared. If needed, more treatment may be started.  [employer] last reviewed this educational content on 7/1/2019 2000-2021 [employer]. All rights reserved. This information is not intended as a substitute for professional medical care. Always follow your healthcare professional's instructions.

## 2021-09-17 LAB — BACTERIA UR CULT: NORMAL

## 2021-10-04 ENCOUNTER — ALLIED HEALTH/NURSE VISIT (OUTPATIENT)
Dept: FAMILY MEDICINE | Facility: CLINIC | Age: 61
End: 2021-10-04
Payer: MEDICAID

## 2021-10-04 ENCOUNTER — OFFICE VISIT (OUTPATIENT)
Dept: FAMILY MEDICINE | Facility: CLINIC | Age: 61
End: 2021-10-04
Payer: MEDICAID

## 2021-10-04 DIAGNOSIS — Z23 NEED FOR VACCINATION: ICD-10-CM

## 2021-10-04 DIAGNOSIS — E78.5 HYPERLIPIDEMIA LDL GOAL <100: ICD-10-CM

## 2021-10-04 DIAGNOSIS — Z87.440 RECENT URINARY TRACT INFECTION: ICD-10-CM

## 2021-10-04 DIAGNOSIS — R73.03 PREDIABETES: Primary | ICD-10-CM

## 2021-10-04 DIAGNOSIS — Z23 NEED FOR PROPHYLACTIC VACCINATION AND INOCULATION AGAINST CHOLERA ALONE: Primary | ICD-10-CM

## 2021-10-04 LAB
CHOLEST SERPL-MCNC: 198 MG/DL
FASTING STATUS PATIENT QL REPORTED: NO
HBA1C MFR BLD: 5.7 % (ref 0–5.6)
HDLC SERPL-MCNC: 66 MG/DL
LDLC SERPL CALC-MCNC: 108 MG/DL
TRIGL SERPL-MCNC: 118 MG/DL

## 2021-10-04 PROCEDURE — 90471 IMMUNIZATION ADMIN: CPT | Performed by: FAMILY MEDICINE

## 2021-10-04 PROCEDURE — 36415 COLL VENOUS BLD VENIPUNCTURE: CPT | Performed by: FAMILY MEDICINE

## 2021-10-04 PROCEDURE — 83036 HEMOGLOBIN GLYCOSYLATED A1C: CPT | Performed by: FAMILY MEDICINE

## 2021-10-04 PROCEDURE — 80061 LIPID PANEL: CPT | Performed by: FAMILY MEDICINE

## 2021-10-04 PROCEDURE — 87086 URINE CULTURE/COLONY COUNT: CPT | Performed by: FAMILY MEDICINE

## 2021-10-04 PROCEDURE — 90750 HZV VACC RECOMBINANT IM: CPT | Performed by: FAMILY MEDICINE

## 2021-10-04 PROCEDURE — 99214 OFFICE O/P EST MOD 30 MIN: CPT | Mod: 25 | Performed by: FAMILY MEDICINE

## 2021-10-04 RX ORDER — ATORVASTATIN CALCIUM 20 MG/1
20 TABLET, FILM COATED ORAL DAILY
Qty: 90 TABLET | Refills: 1 | Status: SHIPPED | OUTPATIENT
Start: 2021-10-04 | End: 2022-03-22

## 2021-10-04 NOTE — LETTER
October 20, 2021      Susi Haro  [address]  [place] [address]        Dear ,    We are writing to inform you of your test results.    Your cholesterol numbers look better!  Your A1c (diabetes average) is 5.7 - this is good.  Continue the same medication.   No sign of infection in the urine.     Resulted Orders   Hemoglobin A1c   Result Value Ref Range    Hemoglobin A1C 5.7 (H) 0.0 - 5.6 %      Comment:      Normal <5.7%   Prediabetes 5.7-6.4%    Diabetes 6.5% or higher     Note: Adopted from ADA consensus guidelines.   Lipid Profile (Chol, Trig, HDL, LDL calc)   Result Value Ref Range    Cholesterol 198 <=199 mg/dL    Triglycerides 118 <=149 mg/dL    Direct Measure HDL 66 >=50 mg/dL      Comment:      HDL Cholesterol Reference Range:     0-2 years:   No reference ranges established for patients under 2 years old  at [facility] for lipid analytes.    2-8 years:  Greater than 45 mg/dL     18 years and older:   Female: Greater than or equal to 50 mg/dL   Male:   Greater than or equal to 40 mg/dL    LDL Cholesterol Calculated 108 <=129 mg/dL    Patient Fasting > 8hrs? No    Urine Culture Aerobic Bacterial   Result Value Ref Range    Culture Mixed Urogenital Gosia        If you have any questions or concerns, please call the clinic at the number listed above.       Sincerely,      Cara Garcia MD

## 2021-10-04 NOTE — PROGRESS NOTES
Immunization History   Administered Date(s) Administered     COVID-19,PF,Khushi 04/08/2021     Influenza,INJ,MDCK,PF,Quad >4yrs 10/14/2020     Tdap (Adacel,Boostrix) 08/21/2012     Zoster vaccine recombinant adjuvanted (SHINGRIX) 07/16/2021, 10/04/2021     Administered Shingrix for patient.     ERROR wrong ENCOUNTER, this was intended for the MA visit schedule.

## 2021-10-04 NOTE — PROGRESS NOTES
Assessment & Plan    1. Prediabetes  Patient has a history of prediabetes - we discussed importance of diet/exercise.  Will recheck A1c to screen further and determine current need for therapy.    - Hemoglobin A1c; Future  - Hemoglobin A1c    2. Hyperlipidemia LDL goal <100  Patient has h/o elevated lipids - on Atorvastatin - check labs.    - atorvastatin (LIPITOR) 20 MG tablet; Take 1 tablet (20 mg) by mouth daily  Dispense: 90 tablet; Refill: 1  - Lipid Profile (Chol, Trig, HDL, LDL calc); Future  - Lipid Profile (Chol, Trig, HDL, LDL calc)    3. Recent urinary tract infection  Patient reports recent UTI - doesn't know if symptoms improved or not - will check urine culture to determine need for further treatment.    - Urine Culture Aerobic Bacterial; Future  - Urine Culture Aerobic Bacterial    4. Need for vaccination  Patient seen by staff for immunization update.        Return in about 3 months (around 1/4/2022) for diabetes.    Chief Complaint   Patient presents with     Lipids     Diabetes      HPI  Has had previous screening suggesting elevated sugars/pre-diabetes  No polyuria, no polydipsia  Activity is poor   Used to be a world class runner (pre-mental health challenges)    Also had labs suggesting elevated lipids    Was recently treated for UTI - wonders if it is still there  Some mild dysuria, frequency         Patient Active Problem List   Diagnosis     Abnormal finding     Abnormal involuntary movements     Akathisia     Atypical squamous cells of undetermined significance on cytologic smear of cervix (ASC-US)     Cervicalgia     Chronic schizophrenia (H)     Deformity of left thumb joint     Family history of diabetes mellitus     Insomnia     Low back pain     Low grade squamous intraepithelial lesion (LGSIL) on Papanicolaou smear of cervix     Neuroleptic-induced tardive dyskinesia     Onychauxis     Atypical bipolar affective disorder (H)        Past Medical History:   Diagnosis Date     Failed  bunionectomy         Current Outpatient Medications   Medication     atorvastatin (LIPITOR) 20 MG tablet     benztropine (COGENTIN) 1 MG tablet     Foot Care Products (CORN CUSHIONS) PADS     melatonin 3 MG tablet     metFORMIN (GLUCOPHAGE) 500 MG tablet     multivitamin w/minerals (MULTIVITAMINS W/MINERALS) tablet     risperiDONE (RISPERDAL) 3 MG tablet     zolpidem (AMBIEN) 5 MG tablet     No current facility-administered medications for this visit.        Past Surgical History:   Procedure Laterality Date     BUNIONECTOMY Left July 8, 2014    [facility] -         Social History     Socioeconomic History     Marital status: Single     Spouse name: Not on file     Number of children: Not on file     Years of education: Not on file     Highest education level: Not on file   Occupational History     Not on file   Tobacco Use     Smoking status: Never Smoker     Smokeless tobacco: Never Used   Substance and Sexual Activity     Alcohol use: Not on file     Drug use: Not on file     Sexual activity: Not on file   Other Topics Concern     Not on file   Social History Narrative     Not on file     Social Determinants of Health     Financial Resource Strain:      Difficulty of Paying Living Expenses:    Food Insecurity:      Worried About Running Out of Food in the Last Year:      Ran Out of Food in the Last Year:    Transportation Needs:      Lack of Transportation (Medical):      Lack of Transportation (Non-Medical):    Physical Activity:      Days of Exercise per Week:      Minutes of Exercise per Session:    Stress:      Feeling of Stress :    Social Connections:      Frequency of Communication with Friends and Family:      Frequency of Social Gatherings with Friends and Family:      Attends Rastafari Services:      Active Member of Clubs or Organizations:      Attends Club or Organization Meetings:      Marital Status:    Intimate Partner Violence:      Fear of Current or Ex-Partner:      Emotionally Abused:       Physically Abused:      Sexually Abused:         Family History   Problem Relation Age of Onset     Breast Cancer Mother          age 72     Bipolar Disorder Mother         and niece     Diabetes Father      Pulmonary Embolism Father      Diabetes Paternal Grandmother      Diabetes Paternal Grandfather      Hypertension Brother      Sleep Apnea Brother         Review of Systems   Genitourinary: Positive for dysuria and frequency.   Psychiatric/Behavioral: The patient is nervous/anxious.    All other systems reviewed and are negative.       There were no vitals taken for this visit.     Physical Exam  Constitutional:       General: She is not in acute distress.     Appearance: She is well-developed and well-nourished.   HENT:      Right Ear: Tympanic membrane and external ear normal.      Left Ear: Tympanic membrane and external ear normal.      Nose: Nose normal.      Mouth/Throat:      Pharynx: No oropharyngeal exudate.   Eyes:      General:         Right eye: No discharge.         Left eye: No discharge.      Conjunctiva/sclera: Conjunctivae normal.      Pupils: Pupils are equal, round, and reactive to light.   Neck:      Thyroid: No thyromegaly.      Trachea: No tracheal deviation.   Cardiovascular:      Rate and Rhythm: Normal rate and regular rhythm.      Pulses: Normal pulses.      Heart sounds: Normal heart sounds, S1 normal and S2 normal. No murmur heard.   No friction rub. No S3 or S4 sounds.    Pulmonary:      Effort: Pulmonary effort is normal. No respiratory distress.      Breath sounds: Normal breath sounds. No wheezing or rales.   Abdominal:      General: Bowel sounds are normal.      Palpations: Abdomen is soft. There is no mass.      Tenderness: There is no abdominal tenderness. There is no right CVA tenderness, left CVA tenderness, guarding or rebound.   Musculoskeletal:         General: Normal range of motion.      Cervical back: Neck supple.   Lymphadenopathy:      Cervical: No cervical  adenopathy.   Skin:     General: Skin is warm and dry.      Findings: No rash.   Neurological:      Mental Status: She is alert and oriented to person, place, and time.      Motor: No abnormal muscle tone.      Deep Tendon Reflexes: Reflexes are normal and symmetric.   Psychiatric:         Mood and Affect: Mood and affect normal.         Thought Content: Thought content normal.         Cognition and Memory: Cognition and memory normal.         Judgment: Judgment normal.      Comments: Poor eye contact, anxious          Results:  Results for orders placed or performed in visit on 10/04/21   Hemoglobin A1c     Status: Abnormal   Result Value Ref Range    Hemoglobin A1C 5.7 (H) 0.0 - 5.6 %   Lipid Profile (Chol, Trig, HDL, LDL calc)     Status: None   Result Value Ref Range    Cholesterol 198 <=199 mg/dL    Triglycerides 118 <=149 mg/dL    Direct Measure HDL 66 >=50 mg/dL    LDL Cholesterol Calculated 108 <=129 mg/dL    Patient Fasting > 8hrs? No    Urine Culture Aerobic Bacterial     Status: None    Specimen: Urine, Midstream   Result Value Ref Range    Culture Mixed Urogenital Gosia        Medications at Conclusion of Visit:  Current Outpatient Medications   Medication Sig Dispense Refill     atorvastatin (LIPITOR) 20 MG tablet Take 1 tablet (20 mg) by mouth daily 90 tablet 1     benztropine (COGENTIN) 1 MG tablet        Foot Care Products (CORN CUSHIONS) PADS Apply topically to small toe (right foot) for corn (Patient not taking: Reported on 9/15/2021) 18 each 3     melatonin 3 MG tablet        metFORMIN (GLUCOPHAGE) 500 MG tablet        multivitamin w/minerals (MULTIVITAMINS W/MINERALS) tablet Take 1 tablet by mouth daily 90 tablet 2     risperiDONE (RISPERDAL) 3 MG tablet        zolpidem (AMBIEN) 5 MG tablet            DOV ESPITIA MD

## 2021-10-06 LAB — BACTERIA UR CULT: NORMAL

## 2021-10-11 ASSESSMENT — ENCOUNTER SYMPTOMS
DYSURIA: 1
FREQUENCY: 1
NERVOUS/ANXIOUS: 1

## 2022-01-05 ENCOUNTER — OFFICE VISIT (OUTPATIENT)
Dept: URGENT CARE | Facility: URGENT CARE | Age: 62
End: 2022-01-05
Payer: MEDICAID

## 2022-01-05 VITALS
WEIGHT: 160 LBS | DIASTOLIC BLOOD PRESSURE: 66 MMHG | TEMPERATURE: 98.1 F | HEIGHT: 63 IN | RESPIRATION RATE: 18 BRPM | SYSTOLIC BLOOD PRESSURE: 110 MMHG | BODY MASS INDEX: 28.35 KG/M2

## 2022-01-05 DIAGNOSIS — R35.0 URINARY FREQUENCY: Primary | ICD-10-CM

## 2022-01-05 LAB
ALBUMIN UR-MCNC: NEGATIVE MG/DL
APPEARANCE UR: CLEAR
BILIRUB UR QL STRIP: NEGATIVE
COLOR UR AUTO: YELLOW
GLUCOSE UR STRIP-MCNC: NEGATIVE MG/DL
HGB UR QL STRIP: NEGATIVE
KETONES UR STRIP-MCNC: NEGATIVE MG/DL
LEUKOCYTE ESTERASE UR QL STRIP: NEGATIVE
NITRATE UR QL: NEGATIVE
PH UR STRIP: 7 [PH] (ref 5–7)
SP GR UR STRIP: 1.01 (ref 1–1.03)
UROBILINOGEN UR STRIP-ACNC: 0.2 E.U./DL

## 2022-01-05 PROCEDURE — 81003 URINALYSIS AUTO W/O SCOPE: CPT | Performed by: PHYSICIAN ASSISTANT

## 2022-01-05 PROCEDURE — 87086 URINE CULTURE/COLONY COUNT: CPT | Performed by: PHYSICIAN ASSISTANT

## 2022-01-05 PROCEDURE — 99213 OFFICE O/P EST LOW 20 MIN: CPT | Performed by: PHYSICIAN ASSISTANT

## 2022-01-05 ASSESSMENT — MIFFLIN-ST. JEOR: SCORE: 1269.89

## 2022-01-05 NOTE — PROGRESS NOTES
Assessment & Plan     1. Urinary frequency  No evidence of UTI on UA  Will culture to ensure an occult infection is not present  Advised follow-up with PCP if urinary urgency and frequency persist  - UA Macro with Reflex to Micro and Culture - lab collect; Future  - UA Macro with Reflex to Micro and Culture - lab collect        Return in about 3 days (around 1/8/2022), or if symptoms worsen or fail to improve.    Diagnosis and treatment plan was reviewed with patient and/or family.   We went over any labs or imaging. Discussed worsening symptoms or little to no relief despite treatment plan to follow-up with PCP or return to clinic.  Patient verbalizes understanding. All questions were addressed and answered.     Bhavana Ponce PA-C  [facility] [place]    CHIEF COMPLAINT:   Chief Complaint   Patient presents with     Urgent Care     UTI     urinary urgency, frequency with little output x 5 days. Denies vaginal symptoms.      Subjective     Susi is a 61 year old female who presents to clinic today for evaluation of urgency and frequency. Symptoms started 5 days ago. Denies having fever, chills, flank pain, nausea, vomiting, hematuria or weakness. Vaginal discharge, itching or pain are not present.     Past Medical History:   Diagnosis Date     Failed bunionectomy      Past Surgical History:   Procedure Laterality Date     BUNIONECTOMY Left July 8, 2014    [facility] -      Social History     Tobacco Use     Smoking status: Never Smoker     Smokeless tobacco: Never Used   Substance Use Topics     Alcohol use: Not on file     Current Outpatient Medications   Medication     atorvastatin (LIPITOR) 20 MG tablet     benztropine (COGENTIN) 1 MG tablet     metFORMIN (GLUCOPHAGE) 500 MG tablet     risperiDONE (RISPERDAL) 3 MG tablet     melatonin 3 MG tablet     multivitamin w/minerals (MULTIVITAMINS W/MINERALS) tablet     zolpidem (AMBIEN) 5 MG tablet     No current facility-administered  "medications for this visit.     Allergies   Allergen Reactions     Lactase      Melon      Ragweeds      sneezing     Trazodone Other (See Comments)     heart burn  heart burn, PN: heart burn         10 point ROS of systems were all negative except for pertinent positives noted in my HPI.      Exam:   /66   Temp 98.1  F (36.7  C) (Temporal)   Resp 18   Ht 1.6 m (5' 3\")   Wt 72.6 kg (160 lb)   BMI 28.34 kg/m    Constitutional: healthy, alert and no distress  ENT: MMM  Cardiovascular: RRR  Respiratory: CTA bilaterally, no rhonchi or rales  Gastrointestinal: soft and nontender  Back: No CVA tenderness B/L  Skin: no rashes      Results for orders placed or performed in visit on 01/05/22   UA Macro with Reflex to Micro and Culture - lab collect     Status: Normal    Specimen: Urine, Midstream   Result Value Ref Range    Color Urine Yellow Colorless, Straw, Light Yellow, Yellow    Appearance Urine Clear Clear    Glucose Urine Negative Negative mg/dL    Bilirubin Urine Negative Negative    Ketones Urine Negative Negative mg/dL    Specific Gravity Urine 1.015 1.003 - 1.035    Blood Urine Negative Negative    pH Urine 7.0 5.0 - 7.0    Protein Albumin Urine Negative Negative mg/dL    Urobilinogen Urine 0.2 0.2, 1.0 E.U./dL    Nitrite Urine Negative Negative    Leukocyte Esterase Urine Negative Negative    Narrative    Microscopic not indicated           "

## 2022-01-06 LAB — BACTERIA UR CULT: NO GROWTH

## 2022-01-20 ENCOUNTER — TELEPHONE (OUTPATIENT)
Dept: NURSING | Facility: CLINIC | Age: 62
End: 2022-01-20
Payer: MEDICAID

## 2022-01-20 NOTE — TELEPHONE ENCOUNTER
Pt is calling in because she does not have her appointment until 2/7/2022, and is requesting her Metformin be changed to extended release because she goes to the bathroom a lot, and has frequent UTI's.      Please call Susi at [phone] to advise.    Neto Hanks RN on 1/20/2022 at 7:35 AM

## 2022-02-07 ENCOUNTER — OFFICE VISIT (OUTPATIENT)
Dept: FAMILY MEDICINE | Facility: CLINIC | Age: 62
End: 2022-02-07
Payer: MEDICAID

## 2022-02-07 VITALS
TEMPERATURE: 99.4 F | SYSTOLIC BLOOD PRESSURE: 108 MMHG | DIASTOLIC BLOOD PRESSURE: 66 MMHG | WEIGHT: 159.75 LBS | HEART RATE: 74 BPM | BODY MASS INDEX: 28.3 KG/M2 | OXYGEN SATURATION: 97 %

## 2022-02-07 DIAGNOSIS — Z12.11 COLON CANCER SCREENING: ICD-10-CM

## 2022-02-07 DIAGNOSIS — R73.03 PREDIABETES: Primary | ICD-10-CM

## 2022-02-07 DIAGNOSIS — F31.89 ATYPICAL BIPOLAR AFFECTIVE DISORDER (H): ICD-10-CM

## 2022-02-07 DIAGNOSIS — R25.9 ABNORMAL INVOLUNTARY MOVEMENTS: ICD-10-CM

## 2022-02-07 DIAGNOSIS — Z00.00 HEALTHCARE MAINTENANCE: ICD-10-CM

## 2022-02-07 DIAGNOSIS — E78.5 HYPERLIPIDEMIA LDL GOAL <100: ICD-10-CM

## 2022-02-07 LAB
ALBUMIN SERPL-MCNC: 4.1 G/DL (ref 3.5–5)
ALP SERPL-CCNC: 83 U/L (ref 45–120)
ALT SERPL W P-5'-P-CCNC: 21 U/L (ref 0–45)
ANION GAP SERPL CALCULATED.3IONS-SCNC: 12 MMOL/L (ref 5–18)
AST SERPL W P-5'-P-CCNC: 19 U/L (ref 0–40)
BILIRUB SERPL-MCNC: 0.7 MG/DL (ref 0–1)
BUN SERPL-MCNC: 13 MG/DL (ref 8–22)
CALCIUM SERPL-MCNC: 9.5 MG/DL (ref 8.5–10.5)
CHLORIDE BLD-SCNC: 106 MMOL/L (ref 98–107)
CO2 SERPL-SCNC: 20 MMOL/L (ref 22–31)
CREAT SERPL-MCNC: 0.68 MG/DL (ref 0.6–1.1)
GFR SERPL CREATININE-BSD FRML MDRD: >90 ML/MIN/1.73M2
GLUCOSE BLD-MCNC: 97 MG/DL (ref 70–125)
HBA1C MFR BLD: 5.7 % (ref 0–5.6)
HOLD SPECIMEN: NORMAL
HOLD SPECIMEN: NORMAL
POTASSIUM BLD-SCNC: 3.9 MMOL/L (ref 3.5–5)
PROT SERPL-MCNC: 7.5 G/DL (ref 6–8)
SODIUM SERPL-SCNC: 138 MMOL/L (ref 136–145)

## 2022-02-07 PROCEDURE — 83036 HEMOGLOBIN GLYCOSYLATED A1C: CPT | Performed by: FAMILY MEDICINE

## 2022-02-07 PROCEDURE — 36415 COLL VENOUS BLD VENIPUNCTURE: CPT | Performed by: FAMILY MEDICINE

## 2022-02-07 PROCEDURE — 80061 LIPID PANEL: CPT | Performed by: FAMILY MEDICINE

## 2022-02-07 PROCEDURE — 99213 OFFICE O/P EST LOW 20 MIN: CPT | Performed by: FAMILY MEDICINE

## 2022-02-07 PROCEDURE — 80053 COMPREHEN METABOLIC PANEL: CPT | Performed by: FAMILY MEDICINE

## 2022-02-07 RX ORDER — METFORMIN HCL 500 MG
TABLET, EXTENDED RELEASE 24 HR ORAL
COMMUNITY
Start: 2022-02-01 | End: 2022-10-21

## 2022-02-07 RX ORDER — TRIAMCINOLONE ACETONIDE 1 MG/G
CREAM TOPICAL
COMMUNITY
Start: 2020-07-15 | End: 2022-08-17

## 2022-02-07 NOTE — LETTER
February 8, 2022      Susi Haro  [address]  [place] [address]        Dear ,    We are writing to inform you of your test results.    Your labs look good - blood sugars are good!  Keep up the activity/exercise and watch the foods you eat.      Resulted Orders   Hemoglobin A1c   Result Value Ref Range    Hemoglobin A1C 5.7 (H) 0.0 - 5.6 %      Comment:      Normal <5.7%   Prediabetes 5.7-6.4%    Diabetes 6.5% or higher     Note: Adopted from ADA consensus guidelines.   Comprehensive metabolic panel (BMP + Alb, Alk Phos, ALT, AST, Total. Bili, TP)   Result Value Ref Range    Sodium 138 136 - 145 mmol/L    Potassium 3.9 3.5 - 5.0 mmol/L    Chloride 106 98 - 107 mmol/L    Carbon Dioxide (CO2) 20 (L) 22 - 31 mmol/L    Anion Gap 12 5 - 18 mmol/L    Urea Nitrogen 13 8 - 22 mg/dL    Creatinine 0.68 0.60 - 1.10 mg/dL    Calcium 9.5 8.5 - 10.5 mg/dL    Glucose 97 70 - 125 mg/dL    Alkaline Phosphatase 83 45 - 120 U/L    AST 19 0 - 40 U/L    ALT 21 0 - 45 U/L    Protein Total 7.5 6.0 - 8.0 g/dL    Albumin 4.1 3.5 - 5.0 g/dL    Bilirubin Total 0.7 0.0 - 1.0 mg/dL    GFR Estimate >90 >60 mL/min/1.73m2      Comment:      Effective December 21, 2021 eGFRcr in adults is calculated using the 2021 CKD-EPI creatinine equation which includes age and gender ( et al., NEJM, DOI: 10.1056/GZKKmg4346175)       If you have any questions or concerns, please call the clinic at the number listed above.       Sincerely,      Cara Garcia MD

## 2022-02-07 NOTE — PROGRESS NOTES
"Assessment & Plan    1. Prediabetes  Sugars are elevated - likely type II DM - check A1c.    - Hemoglobin A1c; Future  - Hemoglobin A1c  - Comprehensive metabolic panel (BMP + Alb, Alk Phos, ALT, AST, Total. Bili, TP); Future  - Comprehensive metabolic panel (BMP + Alb, Alk Phos, ALT, AST, Total. Bili, TP)    2. Hyperlipidemia LDL goal <100  Elevated cholesterol - on Atorvastatin -   - Lipid Profile (Chol, Trig, HDL, LDL calc); Future  - Comprehensive metabolic panel (BMP + Alb, Alk Phos, ALT, AST, Total. Bili, TP); Future  - Comprehensive metabolic panel (BMP + Alb, Alk Phos, ALT, AST, Total. Bili, TP)  - Lipid Profile (Chol, Trig, HDL, LDL calc)    3. Abnormal involuntary movements  Patient with increasing dyskinesia - mostly right arm flapping/flailing as she sits and talks - working with psychiatry regarding this    4. Atypical bipolar affective disorder (H)  Patient with atypical bipolar affective disorder/schizophrenia - generally stable - follows with psychiatry - unable to articulate her specific concerns about her living situation.    5. Colon cancer screening  Due for colon cancer screening - discussed - would do Cologuard  - COLOGUARD(EXACT SCIENCES)    6. Healthcare maintenance  Reviewed HM today  - REVIEW OF HEALTH MAINTENANCE PROTOCOL ORDERS    No follow-ups on file.    Chief Complaint   Patient presents with     Follow Up     Diabetes      HPI  Taking Metformin ER  Atorvastatin  Cogentin  Risperidone  Zolpidem    In situation - trying to evict her from her apartment - has a small one-bedroom   Feels like they take advantage of her there  Is \"scared\"   Has involuntary movements - waving right arm constantly  Has a nurse there - gets her morning/evening medications from them    Lost a brother - age 61 - in last 6 months - was on dialysis -   Had 200+ dialysis treatments -   One day, went to do construction work, ended up passing out - couldn't save him   Dad lived to be 89 yo -  - passed away in " last year    Still has 3 siblings -   She sees oldest sibling    Watching Olympics  But, very worried about her apartment -     Had mammogram -   Mom  of breast cancer/diabetes    Hasn't had COVID -   Has had J&J and then booster shot     Walks daily - tough with the ice/snow - usually goes a couple miles  3000 - 9m47 s  4 min 37 s - 1500  [place] - qualified for [place] trials -   2 hr 27 min     Had bunionectomy left foot - actually got worse                  Patient Active Problem List   Diagnosis     Abnormal finding     Abnormal involuntary movements     Akathisia     Atypical squamous cells of undetermined significance on cytologic smear of cervix (ASC-US)     Cervicalgia     Chronic schizophrenia (H)     Deformity of left thumb joint     Family history of diabetes mellitus     Insomnia     Low back pain     Low grade squamous intraepithelial lesion (LGSIL) on Papanicolaou smear of cervix     Neuroleptic-induced tardive dyskinesia     Onychauxis     Atypical bipolar affective disorder (H)        Past Medical History:   Diagnosis Date     Failed bunionectomy         Current Outpatient Medications   Medication     atorvastatin (LIPITOR) 20 MG tablet     benztropine (COGENTIN) 1 MG tablet     melatonin 3 MG tablet     metFORMIN (GLUCOPHAGE-XR) 500 MG 24 hr tablet     multivitamin w/minerals (MULTIVITAMINS W/MINERALS) tablet     risperiDONE (RISPERDAL) 3 MG tablet     triamcinolone (KENALOG) 0.1 % external cream     zolpidem (AMBIEN) 5 MG tablet     No current facility-administered medications for this visit.        Past Surgical History:   Procedure Laterality Date     BUNIONECTOMY Left 2014    [facility] -         Social History     Socioeconomic History     Marital status: Single     Spouse name: Not on file     Number of children: Not on file     Years of education: Not on file     Highest education level: Not on file   Occupational History     Not on file   Tobacco Use     Smoking status:  Never Smoker     Smokeless tobacco: Never Used   Substance and Sexual Activity     Alcohol use: Not on file     Drug use: Not on file     Sexual activity: Not on file   Other Topics Concern     Not on file   Social History Narrative     Not on file     Social Determinants of Health     Financial Resource Strain: Not on file   Food Insecurity: Not on file   Transportation Needs: Not on file   Physical Activity: Not on file   Stress: Not on file   Social Connections: Not on file   Intimate Partner Violence: Not on file   Housing Stability: Not on file        Family History   Problem Relation Age of Onset     Breast Cancer Mother          age 72     Bipolar Disorder Mother         and niece     Diabetes Father      Pulmonary Embolism Father      Diabetes Paternal Grandmother      Diabetes Paternal Grandfather      Hypertension Brother      Sleep Apnea Brother         Review of Systems   Neurological:        Dyskinetic movements - sariah upper extremities R>>L   Psychiatric/Behavioral:        Paranoia   All other systems reviewed and are negative.       /66   Pulse 74   Temp 99.4  F (37.4  C)   Wt 72.5 kg (159 lb 12 oz)   SpO2 97%   BMI 28.30 kg/m       Physical Exam  Constitutional:       General: She is not in acute distress.     Appearance: She is well-developed.   HENT:      Right Ear: Tympanic membrane and external ear normal.      Left Ear: Tympanic membrane and external ear normal.      Nose: Nose normal.      Mouth/Throat:      Pharynx: No oropharyngeal exudate.   Eyes:      General:         Right eye: No discharge.         Left eye: No discharge.      Conjunctiva/sclera: Conjunctivae normal.      Pupils: Pupils are equal, round, and reactive to light.   Neck:      Thyroid: No thyromegaly.      Trachea: No tracheal deviation.   Cardiovascular:      Rate and Rhythm: Normal rate and regular rhythm.      Pulses: Normal pulses.      Heart sounds: Normal heart sounds, S1 normal and S2 normal. No murmur  heard.  No friction rub. No S3 or S4 sounds.    Pulmonary:      Effort: Pulmonary effort is normal. No respiratory distress.      Breath sounds: Normal breath sounds. No wheezing or rales.   Abdominal:      General: Bowel sounds are normal.      Palpations: Abdomen is soft. There is no mass.      Tenderness: There is no abdominal tenderness.   Musculoskeletal:         General: Normal range of motion.      Cervical back: Neck supple.      Comments: Diabetic foot exam: normal DP and PT pulses, no trophic changes or ulcerative lesions and normal sensory exam     Lymphadenopathy:      Cervical: No cervical adenopathy.   Skin:     General: Skin is warm and dry.      Findings: No rash.   Neurological:      Mental Status: She is alert and oriented to person, place, and time.      Motor: No abnormal muscle tone.      Deep Tendon Reflexes: Reflexes are normal and symmetric.      Comments: Dystonic movements - especially right sided   Psychiatric:         Thought Content: Thought content normal.         Judgment: Judgment normal.          Results:  Results for orders placed or performed in visit on 02/07/22   Hemoglobin A1c     Status: Abnormal   Result Value Ref Range    Hemoglobin A1C 5.7 (H) 0.0 - 5.6 %   Extra Red Top Tube     Status: None   Result Value Ref Range    Hold Specimen JIC    Extra Green Top (Lithium Heparin) Tube     Status: None   Result Value Ref Range    Hold Specimen JIC    Comprehensive metabolic panel (BMP + Alb, Alk Phos, ALT, AST, Total. Bili, TP)     Status: Abnormal   Result Value Ref Range    Sodium 138 136 - 145 mmol/L    Potassium 3.9 3.5 - 5.0 mmol/L    Chloride 106 98 - 107 mmol/L    Carbon Dioxide (CO2) 20 (L) 22 - 31 mmol/L    Anion Gap 12 5 - 18 mmol/L    Urea Nitrogen 13 8 - 22 mg/dL    Creatinine 0.68 0.60 - 1.10 mg/dL    Calcium 9.5 8.5 - 10.5 mg/dL    Glucose 97 70 - 125 mg/dL    Alkaline Phosphatase 83 45 - 120 U/L    AST 19 0 - 40 U/L    ALT 21 0 - 45 U/L    Protein Total 7.5 6.0 - 8.0  g/dL    Albumin 4.1 3.5 - 5.0 g/dL    Bilirubin Total 0.7 0.0 - 1.0 mg/dL    GFR Estimate >90 >60 mL/min/1.73m2   Lipid Profile (Chol, Trig, HDL, LDL calc)     Status: Normal   Result Value Ref Range    Cholesterol 159 <=199 mg/dL    Triglycerides 80 <=149 mg/dL    Direct Measure HDL 63 >=50 mg/dL    LDL Cholesterol Calculated 80 <=129 mg/dL    Non HDL Cholesterol 96 <130 mg/dL    Narrative    Cholesterol  Desirable:  <200 mg/dL    Triglycerides  Normal:  Less than 150 mg/dL  Borderline High:  150-199 mg/dL  High:  200-499 mg/dL  Very High:  Greater than or equal to 500 mg/dL    Direct Measure HDL  Female:  Greater than or equal to 50 mg/dL   Male:  Greater than or equal to 40 mg/dL    LDL Cholesterol  Desirable:  <100mg/dL  Above Desirable:  100-129 mg/dL   Borderline High:  130-159 mg/dL   High:  160-189 mg/dL   Very High:  >= 190 mg/dL    Non HDL Cholesterol  Desirable:  130 mg/dL  Above Desirable:  130-159 mg/dL  Borderline High:  160-189 mg/dL  High:  190-219 mg/dL  Very High:  Greater than or equal to 220 mg/dL   Extra Tube     Status: None    Narrative    The following orders were created for panel order Extra Tube.  Procedure                               Abnormality         Status                     ---------                               -----------         ------                     Extra Red Top Tube[[id number]]                               Final result                 Please view results for these tests on the individual orders.   Extra Tube     Status: None    Narrative    The following orders were created for panel order Extra Tube.  Procedure                               Abnormality         Status                     ---------                               -----------         ------                     Extra Green Top (Lithium...[[id number]]                      Final result                 Please view results for these tests on the individual orders.       Medications at Conclusion of  Visit:  Current Outpatient Medications   Medication Sig Dispense Refill     atorvastatin (LIPITOR) 20 MG tablet Take 1 tablet (20 mg) by mouth daily 90 tablet 1     benztropine (COGENTIN) 1 MG tablet        melatonin 3 MG tablet        metFORMIN (GLUCOPHAGE-XR) 500 MG 24 hr tablet        multivitamin w/minerals (MULTIVITAMINS W/MINERALS) tablet Take 1 tablet by mouth daily 90 tablet 2     risperiDONE (RISPERDAL) 3 MG tablet        triamcinolone (KENALOG) 0.1 % external cream Apply topically sparingly two times a day to affected area only (legs)       zolpidem (AMBIEN) 5 MG tablet            DOV ESPITIA MD

## 2022-02-08 LAB
CHOLEST SERPL-MCNC: 159 MG/DL
HDLC SERPL-MCNC: 63 MG/DL
LDLC SERPL CALC-MCNC: 80 MG/DL
NONHDLC SERPL-MCNC: 96 MG/DL
TRIGL SERPL-MCNC: 80 MG/DL

## 2022-03-21 DIAGNOSIS — E78.5 HYPERLIPIDEMIA LDL GOAL <100: ICD-10-CM

## 2022-03-22 RX ORDER — ATORVASTATIN CALCIUM 20 MG/1
TABLET, FILM COATED ORAL
Qty: 90 TABLET | Refills: 3 | Status: SHIPPED | OUTPATIENT
Start: 2022-03-22 | End: 2022-10-21

## 2022-03-22 NOTE — TELEPHONE ENCOUNTER
"  Last Written Prescription Date:  10/4/2021  Last Fill Quantity: 90,  # refills: 1   Last office visit provider:  2/7/2022     Requested Prescriptions   Pending Prescriptions Disp Refills     atorvastatin (LIPITOR) 20 MG tablet [Pharmacy Med Name: ATORVASTATIN 20 MG TABLET 20 Tablet] 90 tablet 0     Sig: TAKE 1 TABLET BY MOUTH DAILY       Statins Protocol Passed - 3/21/2022 12:41 PM        Passed - LDL on file in past 12 months     Recent Labs   Lab Test 02/07/22  1202   LDL 80             Passed - No abnormal creatine kinase in past 12 months     No lab results found.             Passed - Recent (12 mo) or future (30 days) visit within the authorizing provider's specialty     Patient has had an office visit with the authorizing provider or a provider within the authorizing providers department within the previous 12 mos or has a future within next 30 days. See \"Patient Info\" tab in inbasket, or \"Choose Columns\" in Meds & Orders section of the refill encounter.              Passed - Medication is active on med list        Passed - Patient is age 18 or older        Passed - No active pregnancy on record        Passed - No positive pregnancy test in past 12 months             Lisa Craven RN 03/22/22 1:16 PM  "

## 2022-04-18 DIAGNOSIS — Z00.00 HEALTH CARE MAINTENANCE: ICD-10-CM

## 2022-04-20 RX ORDER — MULTIPLE VITAMINS W/ MINERALS TAB 9MG-400MCG
1 TAB ORAL DAILY
Qty: 90 TABLET | Refills: 3 | Status: SHIPPED | OUTPATIENT
Start: 2022-04-20 | End: 2023-02-06

## 2022-04-20 NOTE — TELEPHONE ENCOUNTER
"Last Written Prescription Date:  7/24/2021  Last Fill Quantity: 90,  # refills: 2   Last office visit provider:  2/7/2022     Requested Prescriptions   Pending Prescriptions Disp Refills     multivitamin w/minerals (THERA-VIT-M) tablet [Pharmacy Med Name: THERA M PLUS TABS Tablet] 90 tablet 2     Sig: TAKE 1 TABLET BY MOUTH DAILY       Vitamin Supplements (Adult) Protocol Passed - 4/20/2022  9:38 AM        Passed - High dose Vitamin D not ordered        Passed - Recent (12 mo) or future (30 days) visit within the authorizing provider's specialty     Patient has had an office visit with the authorizing provider or a provider within the authorizing providers department within the previous 12 mos or has a future within next 30 days. See \"Patient Info\" tab in inbasket, or \"Choose Columns\" in Meds & Orders section of the refill encounter.              Passed - Medication is active on med list             Hanna Ballard RN 04/20/22 9:38 AM  "

## 2022-05-03 ENCOUNTER — ALLIED HEALTH/NURSE VISIT (OUTPATIENT)
Dept: FAMILY MEDICINE | Facility: CLINIC | Age: 62
End: 2022-05-03
Payer: MEDICAID

## 2022-05-03 DIAGNOSIS — Z23 NEED FOR TETANUS BOOSTER: ICD-10-CM

## 2022-05-03 PROCEDURE — 90715 TDAP VACCINE 7 YRS/> IM: CPT

## 2022-05-03 PROCEDURE — 99207 PR NO CHARGE NURSE ONLY: CPT

## 2022-05-03 PROCEDURE — 90471 IMMUNIZATION ADMIN: CPT

## 2022-05-12 ENCOUNTER — TELEPHONE (OUTPATIENT)
Dept: FAMILY MEDICINE | Facility: CLINIC | Age: 62
End: 2022-05-12
Payer: MEDICAID

## 2022-05-12 NOTE — TELEPHONE ENCOUNTER
Reason for Call:  Form, our goal is to have forms completed with 72 hours, however, some forms may require a visit or additional information.    Type of letter, form or note:  Medical Necessity for AC UNIT    Who is the form from?: [employer]  (if other please explain)    Where did the form come from: form was faxed in    What clinic location was the form placed at?: [facility]     Where the form was placed: Put in provider's mailbox up front    What number is listed as a contact on the form?: [phone]       Additional comments: Lynn called stated that they faxed over this form several times over the course of 3 months with no return of completed form. Lynn request if this form can be completed ASAP as pt will need an AC units due to the summer heat. Please complete form is applicable. If unable to complete form please call Lynn back at [phone]. Thank you.     Call taken on 5/12/2022 at 12:08 PM by Kavon Reyes

## 2022-05-27 PROBLEM — R68.89 INTOLERANCE TO HEAT: Status: ACTIVE | Noted: 2022-05-27

## 2022-05-27 PROBLEM — Z79.899 HIGH RISK MEDICATION USE: Status: ACTIVE | Noted: 2022-05-27

## 2022-05-27 NOTE — TELEPHONE ENCOUNTER
Called and spoke to pt and let her know form was completed and faxed. Original mailed to home address and copy sent to scan

## 2022-05-30 ENCOUNTER — MEDICAL CORRESPONDENCE (OUTPATIENT)
Dept: HEALTH INFORMATION MANAGEMENT | Facility: CLINIC | Age: 62
End: 2022-05-30
Payer: MEDICAID

## 2022-08-16 RX ORDER — ATORVASTATIN CALCIUM 20 MG/1
20 TABLET, FILM COATED ORAL
COMMUNITY
End: 2022-08-17

## 2022-08-16 RX ORDER — METFORMIN HCL 500 MG
500 TABLET, EXTENDED RELEASE 24 HR ORAL
COMMUNITY
End: 2022-08-17

## 2022-08-17 ENCOUNTER — ANCILLARY PROCEDURE (OUTPATIENT)
Dept: MAMMOGRAPHY | Facility: CLINIC | Age: 62
End: 2022-08-17
Attending: FAMILY MEDICINE
Payer: MEDICAID

## 2022-08-17 ENCOUNTER — OFFICE VISIT (OUTPATIENT)
Dept: FAMILY MEDICINE | Facility: CLINIC | Age: 62
End: 2022-08-17

## 2022-08-17 VITALS
TEMPERATURE: 98.1 F | RESPIRATION RATE: 17 BRPM | WEIGHT: 157 LBS | SYSTOLIC BLOOD PRESSURE: 92 MMHG | BODY MASS INDEX: 27.81 KG/M2 | DIASTOLIC BLOOD PRESSURE: 63 MMHG

## 2022-08-17 DIAGNOSIS — Z12.31 VISIT FOR SCREENING MAMMOGRAM: ICD-10-CM

## 2022-08-17 DIAGNOSIS — R73.03 PREDIABETES: Primary | ICD-10-CM

## 2022-08-17 DIAGNOSIS — E78.5 HYPERLIPIDEMIA LDL GOAL <100: ICD-10-CM

## 2022-08-17 DIAGNOSIS — F31.89 ATYPICAL BIPOLAR AFFECTIVE DISORDER (H): ICD-10-CM

## 2022-08-17 DIAGNOSIS — Z12.11 SCREEN FOR COLON CANCER: ICD-10-CM

## 2022-08-17 DIAGNOSIS — Z91.81 AT HIGH RISK FOR FALLS: ICD-10-CM

## 2022-08-17 LAB
ALT SERPL W P-5'-P-CCNC: 31 U/L (ref 10–35)
ANION GAP SERPL CALCULATED.3IONS-SCNC: 11 MMOL/L (ref 7–15)
BUN SERPL-MCNC: 12.8 MG/DL (ref 8–23)
CALCIUM SERPL-MCNC: 9.5 MG/DL (ref 8.8–10.2)
CHLORIDE SERPL-SCNC: 102 MMOL/L (ref 98–107)
CHOLEST SERPL-MCNC: 159 MG/DL
CREAT SERPL-MCNC: 0.66 MG/DL (ref 0.51–0.95)
DEPRECATED HCO3 PLAS-SCNC: 26 MMOL/L (ref 22–29)
GFR SERPL CREATININE-BSD FRML MDRD: >90 ML/MIN/1.73M2
GLUCOSE SERPL-MCNC: 106 MG/DL (ref 70–99)
HBA1C MFR BLD: 5.6 % (ref 0–5.6)
HDLC SERPL-MCNC: 61 MG/DL
LDLC SERPL CALC-MCNC: 81 MG/DL
NONHDLC SERPL-MCNC: 98 MG/DL
POTASSIUM SERPL-SCNC: 3.9 MMOL/L (ref 3.4–5.3)
SODIUM SERPL-SCNC: 139 MMOL/L (ref 136–145)
TRIGL SERPL-MCNC: 85 MG/DL

## 2022-08-17 PROCEDURE — 80048 BASIC METABOLIC PNL TOTAL CA: CPT | Performed by: FAMILY MEDICINE

## 2022-08-17 PROCEDURE — 83036 HEMOGLOBIN GLYCOSYLATED A1C: CPT | Performed by: FAMILY MEDICINE

## 2022-08-17 PROCEDURE — 80061 LIPID PANEL: CPT | Performed by: FAMILY MEDICINE

## 2022-08-17 PROCEDURE — 99214 OFFICE O/P EST MOD 30 MIN: CPT | Performed by: FAMILY MEDICINE

## 2022-08-17 PROCEDURE — 84460 ALANINE AMINO (ALT) (SGPT): CPT | Performed by: FAMILY MEDICINE

## 2022-08-17 PROCEDURE — 77067 SCR MAMMO BI INCL CAD: CPT | Mod: TC | Performed by: RADIOLOGY

## 2022-08-17 PROCEDURE — 36415 COLL VENOUS BLD VENIPUNCTURE: CPT | Performed by: FAMILY MEDICINE

## 2022-08-17 RX ORDER — DEUTETRABENAZINE 6 MG/1
TABLET, COATED ORAL
COMMUNITY
Start: 2022-08-04 | End: 2022-10-21 | Stop reason: DRUGHIGH

## 2022-08-17 RX ORDER — RISPERIDONE 4 MG/1
4 TABLET ORAL AT BEDTIME
COMMUNITY
Start: 2022-08-01 | End: 2022-10-21

## 2022-08-17 NOTE — LETTER
September 30, 2022      Susi QUEZADA Tahir  [address]  [place] [address]        Dear ,    We are writing to inform you of your test results.    It doesn't look like you've seen your results.  I've been unable to reach you by phone.  And you missed your scheduled visit with me this week.  So - I wanted to share these results with you:  The Cologuard (the cardboard box colon cancer screening test) is positive - this means that there may be colon cancer or a precursor to colon cancer in your colon.  This means that you really need a colonoscopy (the test you were trying to avoid) to look at the colon and see what is going on.  As this is just a screening test, sometimes we find nothing when we look at the colonoscopy but sometimes we find precancerous polyps and once in a while a true cancer.  So - I really encourage you to contact me so we can get you referred for the colonoscopy.  Please send me a ustyme message or call me when you get these results.      Resulted Orders   COLOGUARD(EXACT SCIENCES)   Result Value Ref Range    COLOGUARD-ABSTRACT Positive (A) Negative      Comment:        POSITIVE TEST RESULT. A positive Cologuard result should be followed with a colonoscopy or visual examination of the colon. The normal value (reference range) for this assay is negative.    TEST DESCRIPTION: Composite algorithmic analysis of stool DNA-biomarkers with hemoglobin immunoassay.   Quantitative values of individual biomarkers are not reportable and are not associated with individual biomarker result reference ranges. Cologuard is intended for colorectal cancer screening of adults of either sex, 45 years or older, who are at average-risk for colorectal cancer (CRC). Cologuard has been approved for use by the U.S. FDA. The performance of Cologuard was established in a cross sectional study of average-risk adults aged 50-84. Cologuard performance in patients ages 45 to 49 years was estimated by sub-group  analysis of near-age groups. Colonoscopies performed for a positive result may find as the most clinically significant lesion: colorectal cancer [4.0%], advanced adenoma  (including sessile serrated polyps greater than or equal to 1cm diameter) [20%] or non- advanced adenoma [31%]; or no colorectal neoplasia [45%]. These estimates are derived from a prospective cross-sectional screening study of 10,000 individuals at average risk for colorectal cancer who were screened with both Cologuard and colonoscopy. (Tapan Toscano al, N Engl J Med 2014;370(14):0942-4612.) Cologuard may produce a false negative or false positive result (no colorectal cancer or precancerous polyp present at colonoscopy follow up). A negative Cologuard test result does not guarantee the absence of CRC or advanced adenoma (pre-cancer). The current Cologuard screening interval is every 3 years. (American Cancer Society and U.S. Multi-Society Task Force). Cologuard performance data in a 10,000 patient pivotal study using colonoscopy as the reference method can be accessed at the following location: www.[employer]/results. Additional description of the Cologuard test process,  warnings and precautions can be found at www.[employer].com.     Hemoglobin A1c   Result Value Ref Range    Hemoglobin A1C 5.6 0.0 - 5.6 %      Comment:      Normal <5.7%   Prediabetes 5.7-6.4%    Diabetes 6.5% or higher     Note: Adopted from ADA consensus guidelines.   Lipid Profile (Chol, Trig, HDL, LDL calc)   Result Value Ref Range    Cholesterol 159 <200 mg/dL    Triglycerides 85 <150 mg/dL    Direct Measure HDL 61 >=50 mg/dL    LDL Cholesterol Calculated 81 <=100 mg/dL    Non HDL Cholesterol 98 <130 mg/dL    Narrative    Cholesterol  Desirable:  <200 mg/dL    Triglycerides  Normal:  Less than 150 mg/dL  Borderline High:  150-199 mg/dL  High:  200-499 mg/dL  Very High:  Greater than or equal to 500 mg/dL    Direct Measure HDL  Female:  Greater than or equal to 50  mg/dL   Male:  Greater than or equal to 40 mg/dL    LDL Cholesterol  Desirable:  <100mg/dL  Above Desirable:  100-129 mg/dL   Borderline High:  130-159 mg/dL   High:  160-189 mg/dL   Very High:  >= 190 mg/dL    Non HDL Cholesterol  Desirable:  130 mg/dL  Above Desirable:  130-159 mg/dL  Borderline High:  160-189 mg/dL  High:  190-219 mg/dL  Very High:  Greater than or equal to 220 mg/dL   Basic metabolic panel  (Ca, Cl, CO2, Creat, Gluc, K, Na, BUN)   Result Value Ref Range    Creatinine 0.66 0.51 - 0.95 mg/dL    Sodium 139 136 - 145 mmol/L    Potassium 3.9 3.4 - 5.3 mmol/L    Urea Nitrogen 12.8 8.0 - 23.0 mg/dL    Chloride 102 98 - 107 mmol/L    Carbon Dioxide (CO2) 26 22 - 29 mmol/L    Anion Gap 11 7 - 15 mmol/L    Glucose 106 (H) 70 - 99 mg/dL    GFR Estimate >90 >60 mL/min/1.73m2      Comment:      Effective December 21, 2021 eGFRcr in adults is calculated using the 2021 CKD-EPI creatinine equation which includes age and gender (Kashmir et al., NEJM, DOI: 10.1056/SWIIrw5343135)    Calcium 9.5 8.8 - 10.2 mg/dL   ALT   Result Value Ref Range    ALT 31 10 - 35 U/L       If you have any questions or concerns, please call the clinic at the number listed above.       Sincerely,      Cara Garcia MD

## 2022-08-17 NOTE — PROGRESS NOTES
"  1. Prediabetes  Patient with history of prediabetes - needs follow up labs - medications as needed based on results -   Hemoglobin A1C   Date Value Ref Range Status   08/17/2022 5.6 0.0 - 5.6 % Final     Comment:     Normal <5.7%   Prediabetes 5.7-6.4%    Diabetes 6.5% or higher     Note: Adopted from ADA consensus guidelines.   04/13/2021 5.6 0 - 5.6 % Final     Comment:     Normal <5.7% Prediabetes 5.7-6.4%  Diabetes 6.5% or higher - adopted from ADA   consensus guidelines.       Of note - A1c today is normal.  No need for adjustment of medications  - Hemoglobin A1c; Future  - Hemoglobin A1c    2. Hyperlipidemia LDL goal <100  On Atorvastatin (tolerating okay); check labs -   - Lipid Profile (Chol, Trig, HDL, LDL calc); Future  - Basic metabolic panel  (Ca, Cl, CO2, Creat, Gluc, K, Na, BUN); Future  - ALT; Future  - Lipid Profile (Chol, Trig, HDL, LDL calc)  - Basic metabolic panel  (Ca, Cl, CO2, Creat, Gluc, K, Na, BUN)  - ALT    3. Atypical bipolar affective disorder (H)  Bipolar/schizoaffective disorder - managed by psychiatry    4. At high risk for falls  Patient has difficulty walking - difficulty with balance (related to medications).      5. Screen for colon cancer  Due for colon cancer screening - agrees to Cologuard  - COLOGUARD([employer])      Subjective   Susi is a 62 year old accompanied by her self, presenting for the following health issues:  Diabetes (Pt stated that she fell sometime last week. Her back by her upper right shoulder where she has a lot of pain. Pt stated she has urinating a lot.)      Living in studio - [facility]  They dispense her medication   Gets \"moms\" meals - packaged meals to reheat  Lost her brother a year ago from diabetes  - age 63 - had been on dialysis for a while    Patient wonders if she needs any of the medication -   Rybelsus/Latuda/Jardiance/Farxiga/Ozempic - she sees these advertised on TV  Was in hospital at [facility] - end of July -   Took her off Zolpidem " -   Still on Risperidone  Sees psychiatrist for refills on mental health meds    Walks 1-2 hours/day    Has a big old bruise -  Right upper back  Falls occasionally  Had failed bunion surgery on right foot (2015)  Limited mobility issues due to that  Does have cane -     Wants labs faxed to:  [facility]   Needs labs faxed to her  [phone] (phone #)               Review of Systems   Constitutional: Positive for fatigue. Negative for chills and fever.   HENT: Negative.    Eyes: Negative for visual disturbance.   Respiratory: Negative.  Negative for cough and shortness of breath.    Cardiovascular: Negative for chest pain and peripheral edema.   Gastrointestinal: Negative for abdominal pain.   Endocrine: Negative for polydipsia and polyuria.   Breasts:  negative.    Genitourinary: Negative.    Musculoskeletal: Positive for arthralgias.   Skin: Negative.    Allergic/Immunologic: Negative.    Neurological:        Dystonic movements     Hematological: Negative.    Psychiatric/Behavioral:        Baseline     All other systems reviewed and are negative.           Objective    BP 92/63 (BP Location: Right arm, Patient Position: Sitting, Cuff Size: Adult Regular)   Temp 98.1  F (36.7  C) (Temporal)   Resp 17   Wt 71.2 kg (157 lb)   BMI 27.81 kg/m    Body mass index is 27.81 kg/m .  Physical Exam       Results for orders placed or performed in visit on 08/17/22   *MA Screening Digital Bilateral     Status: None    Narrative    BILATERAL FULL FIELD DIGITAL SCREENING MAMMOGRAM    Performed on: 8/17/22    Compared to: 07/16/2021, 06/07/2018, 02/26/2016, 01/26/2015, 12/23/2013,   12/12/2013, and 03/24/2011    Technique: This study was evaluated with the assistance of Computer-Aided   Detection.    Findings: The breasts have scattered areas of fibroglandular density.    There is no radiographic evidence of malignancy.     IMPRESSION: ACR BI-RADS Category 1: Negative    RECOMMENDED FOLLOW-UP: Annual routine  screening mammogram    The results and recommendations of this examination will be communicated   to the patient.     Results for orders placed or performed in visit on 08/17/22   Hemoglobin A1c     Status: Normal   Result Value Ref Range    Hemoglobin A1C 5.6 0.0 - 5.6 %   Lipid Profile (Chol, Trig, HDL, LDL calc)     Status: Normal   Result Value Ref Range    Cholesterol 159 <200 mg/dL    Triglycerides 85 <150 mg/dL    Direct Measure HDL 61 >=50 mg/dL    LDL Cholesterol Calculated 81 <=100 mg/dL    Non HDL Cholesterol 98 <130 mg/dL    Narrative    Cholesterol  Desirable:  <200 mg/dL    Triglycerides  Normal:  Less than 150 mg/dL  Borderline High:  150-199 mg/dL  High:  200-499 mg/dL  Very High:  Greater than or equal to 500 mg/dL    Direct Measure HDL  Female:  Greater than or equal to 50 mg/dL   Male:  Greater than or equal to 40 mg/dL    LDL Cholesterol  Desirable:  <100mg/dL  Above Desirable:  100-129 mg/dL   Borderline High:  130-159 mg/dL   High:  160-189 mg/dL   Very High:  >= 190 mg/dL    Non HDL Cholesterol  Desirable:  130 mg/dL  Above Desirable:  130-159 mg/dL  Borderline High:  160-189 mg/dL  High:  190-219 mg/dL  Very High:  Greater than or equal to 220 mg/dL   Basic metabolic panel  (Ca, Cl, CO2, Creat, Gluc, K, Na, BUN)     Status: Abnormal   Result Value Ref Range    Creatinine 0.66 0.51 - 0.95 mg/dL    Sodium 139 136 - 145 mmol/L    Potassium 3.9 3.4 - 5.3 mmol/L    Urea Nitrogen 12.8 8.0 - 23.0 mg/dL    Chloride 102 98 - 107 mmol/L    Carbon Dioxide (CO2) 26 22 - 29 mmol/L    Anion Gap 11 7 - 15 mmol/L    Glucose 106 (H) 70 - 99 mg/dL    GFR Estimate >90 >60 mL/min/1.73m2    Calcium 9.5 8.8 - 10.2 mg/dL   ALT     Status: Normal   Result Value Ref Range    ALT 31 10 - 35 U/L                   .  ..

## 2022-08-21 ASSESSMENT — ENCOUNTER SYMPTOMS
CHILLS: 0
COUGH: 0
SHORTNESS OF BREATH: 0
POLYDIPSIA: 0
FATIGUE: 1
HEMATOLOGIC/LYMPHATIC NEGATIVE: 1
ALLERGIC/IMMUNOLOGIC NEGATIVE: 1
RESPIRATORY NEGATIVE: 1
FEVER: 0
ABDOMINAL PAIN: 0
ARTHRALGIAS: 1

## 2022-08-31 LAB — NONINV COLON CA DNA+OCC BLD SCRN STL QL: POSITIVE

## 2022-10-01 ENCOUNTER — HEALTH MAINTENANCE LETTER (OUTPATIENT)
Age: 62
End: 2022-10-01

## 2022-10-03 ENCOUNTER — TRANSFERRED RECORDS (OUTPATIENT)
Dept: HEALTH INFORMATION MANAGEMENT | Facility: CLINIC | Age: 62
End: 2022-10-03

## 2022-10-04 ENCOUNTER — TRANSFERRED RECORDS (OUTPATIENT)
Dept: HEALTH INFORMATION MANAGEMENT | Facility: CLINIC | Age: 62
End: 2022-10-04

## 2022-10-07 ENCOUNTER — OFFICE VISIT (OUTPATIENT)
Dept: URGENT CARE | Facility: URGENT CARE | Age: 62
End: 2022-10-07
Payer: MEDICAID

## 2022-10-07 VITALS
SYSTOLIC BLOOD PRESSURE: 103 MMHG | TEMPERATURE: 98.8 F | OXYGEN SATURATION: 96 % | HEIGHT: 63 IN | HEART RATE: 71 BPM | BODY MASS INDEX: 27.81 KG/M2 | DIASTOLIC BLOOD PRESSURE: 63 MMHG

## 2022-10-07 DIAGNOSIS — R35.0 INCREASED URINARY FREQUENCY: Primary | ICD-10-CM

## 2022-10-07 LAB
ALBUMIN UR-MCNC: NEGATIVE MG/DL
APPEARANCE UR: CLEAR
BILIRUB UR QL STRIP: NEGATIVE
COLOR UR AUTO: YELLOW
GLUCOSE UR STRIP-MCNC: NEGATIVE MG/DL
HGB UR QL STRIP: NEGATIVE
KETONES UR STRIP-MCNC: NEGATIVE MG/DL
LEUKOCYTE ESTERASE UR QL STRIP: NEGATIVE
NITRATE UR QL: NEGATIVE
PH UR STRIP: 5 [PH] (ref 5–7)
SP GR UR STRIP: <=1.005 (ref 1–1.03)
UROBILINOGEN UR STRIP-ACNC: 0.2 E.U./DL

## 2022-10-07 PROCEDURE — 87086 URINE CULTURE/COLONY COUNT: CPT | Performed by: FAMILY MEDICINE

## 2022-10-07 PROCEDURE — 81003 URINALYSIS AUTO W/O SCOPE: CPT | Performed by: FAMILY MEDICINE

## 2022-10-07 PROCEDURE — 99213 OFFICE O/P EST LOW 20 MIN: CPT | Performed by: FAMILY MEDICINE

## 2022-10-07 RX ORDER — QUETIAPINE FUMARATE 150 MG/1
150 TABLET, FILM COATED ORAL
COMMUNITY
Start: 2022-10-03 | End: 2022-10-21 | Stop reason: DRUGHIGH

## 2022-10-07 RX ORDER — GABAPENTIN 300 MG/1
300 CAPSULE ORAL
COMMUNITY
Start: 2022-10-03 | End: 2022-10-21 | Stop reason: DRUGHIGH
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
## 2022-10-07 NOTE — PATIENT INSTRUCTIONS
If your symptoms worsen such as pain with urination, seeing blood in your urine or developing back pain or fever please come in right away to be evaluated      We will have the results of urine culture the next 2 days if this grows any bacteria we can consider starting antibiotic if your symptoms continue

## 2022-10-09 ENCOUNTER — NURSE TRIAGE (OUTPATIENT)
Dept: NURSING | Facility: CLINIC | Age: 62
End: 2022-10-09

## 2022-10-09 NOTE — TELEPHONE ENCOUNTER
"Pt called stating she was seen Friday and she is still having symptome of frequent urination, and bladder feeling full. Pt denied fever or additional symptoms.       Per protocal pt was advised to go to the emergency department to be seen, and pt stated she was told if her symptomes continue to call back for antibiotics. Pt is requesting antibiotic.      Pt was told urgent care will be called to request the antibiotic. RN called [place] and and was told different provider is in and pt was advised to go to the the ER to be seen since they are not seeing anymore pt's tonight.    Per Urgent care note, it states\" discussed if symptoms persist we should have the results of the urine culture the back the next couple days may be able to use this as a guide for antibiotic therapy\"    Pt's UC lab results are still bending.       RN called pt back and she was informed message will be sent to urgent care and to fallow up tomorrow. Pt stated okay.        Adam Padgett RN  [facility]         COVID 19 Nurse Triage Plan/Patient Instructions    Please be aware that novel coronavirus (COVID-19) may be circulating in the community. If you develop symptoms such as fever, cough, or SOB or if you have concerns about the presence of another infection including coronavirus (COVID-19), please contact your health care provider or visit [email or web address].     Disposition/Instructions    ED Visit recommended. Follow protocol based instructions.     Bring Your Own Device:  Please also bring your smart device(s) (smart phones, tablets, laptops) and their charging cables for your personal use and to communicate with your care team during your visit.    Thank you for taking steps to prevent the spread of this virus.  o Limit your contact with others.  o Wear a simple mask to cover your cough.  o Wash your hands well and often.    Resources    [facility]: About COVID-19: " [email or web address]    CDC: What to Do If You're Sick: www.cdc.gov/coronavirus/2019-ncov/about/steps-when-sick.html    CDC: Ending Home Isolation: www.cdc.gov/coronavirus/2019-ncov/hcp/disposition-in-home-patients.html     CDC: Caring for Someone: www.cdc.gov/coronavirus/2019-ncov/if-you-are-sick/care-for-someone.html     [facility]: Interim Guidance for Hospital Discharge to Home: [email or web address]    [facility] clinical trials (COVID-19 research studies): [email or web address]     Below are the COVID-19 hotlines at the [place] [facility] ([facility]). Interpreters are available.   o For health questions: Call 125-675-0134 or 1-690.622.3768 (7 a.m. to 7 p.m.)  o For questions about schools and childcare: Call 758-087-7197 or 1-328.223.5011 (7 a.m. to 7 p.m.)         Reason for Disposition    [1] Unable to urinate (or only a few drops) > 4 hours AND [2] bladder feels very full (e.g., palpable bladder or strong urge to urinate)    Additional Information    Negative: Shock suspected (e.g., cold/pale/clammy skin, too weak to stand, low BP, rapid pulse)    Negative: Sounds like a life-threatening emergency to the triager    Negative: Followed a female genital area injury (e.g., vagina, vulva)    Negative: Followed a male genital area injury (e.g., penis, scrotum)    Negative: Vaginal discharge    Negative: Pus (white, yellow) or bloody discharge from end of penis    Negative: [1] Taking antibiotic for urinary tract infection (UTI) AND [2] female    Negative: [1] Taking antibiotic for urinary tract infection (UTI) AND [2] male    Negative: [1] Pain or burning with passing urine (urination) AND [2] pregnant    Negative: [1] Pain or burning with passing urine (urination) AND [2] postpartum (from 0 to 6 weeks after delivery)    Negative: [1] Pain or burning with passing urine (urination) AND [2] female    Negative: [1] Pain or  burning with passing urine (urination) AND [2] male    Negative: Pain or itching in the vulvar area    Negative: Pain in scrotum is main symptom    Negative: Blood in the urine is main symptom    Negative: Symptoms arising from use of a urinary catheter (e.g., coude, Don)    Protocols used: URINARY SYMPTOMS-A-AH

## 2022-10-10 ENCOUNTER — TELEPHONE (OUTPATIENT)
Dept: FAMILY MEDICINE | Facility: CLINIC | Age: 62
End: 2022-10-10

## 2022-10-10 DIAGNOSIS — R39.9 UTI SYMPTOMS: Primary | ICD-10-CM

## 2022-10-10 LAB — BACTERIA UR CULT: NORMAL

## 2022-10-10 NOTE — TELEPHONE ENCOUNTER
RN called patient regarding her UTI symptoms.     Patient went to urgent care last Friday but did not receive any antibiotic treatment at that time.     Patient called today and stated that she still has symptoms of urgency and frequency but no pain or fever.       Patient would like Dr. Olvera to prescribe antibiotics and send it to [facility] pharmacy.      RN will route this encounter to  to review and advise.          Cherelle Mueller RN  [facility]

## 2022-10-10 NOTE — TELEPHONE ENCOUNTER
General Call      Reason for Call:  Pt calling regarding antibiotic for UTI she was seen in urgent care. Pt said urgent care was suppose to request pt PCP for antibiotic. Please send antibiotic to [facility] [place]. Thank you!    What are your questions or concerns:  antibiotic    Date of last appointment with provider: 08/17/2022    Could we send this information to you in [facility] or would you prefer to receive a phone call?:   Patient would prefer a phone call   Okay to leave a detailed message?: Yes at Home number on file [phone] (home)

## 2022-10-12 RX ORDER — CEPHALEXIN 500 MG/1
500 CAPSULE ORAL 2 TIMES DAILY
Qty: 10 CAPSULE | Refills: 0 | Status: SHIPPED | OUTPATIENT
Start: 2022-10-12 | End: 2022-10-21

## 2022-10-12 NOTE — TELEPHONE ENCOUNTER
Unable to reach patient.  Urine culture was actually negative.  Still having symptoms apparently.  Will treat.  Recheck next week at scheduled appointment.

## 2022-10-18 RX ORDER — MULTIVITAMIN WITH FOLIC ACID 400 MCG
TABLET ORAL
COMMUNITY
Start: 2022-10-03 | End: 2023-02-06

## 2022-10-18 RX ORDER — POLYETHYLENE GLYCOL 3350 17 G/17G
17 POWDER, FOR SOLUTION ORAL
COMMUNITY
Start: 2022-10-03

## 2022-10-21 ENCOUNTER — OFFICE VISIT (OUTPATIENT)
Dept: FAMILY MEDICINE | Facility: CLINIC | Age: 62
End: 2022-10-21
Payer: MEDICAID

## 2022-10-21 VITALS
WEIGHT: 163 LBS | SYSTOLIC BLOOD PRESSURE: 100 MMHG | RESPIRATION RATE: 12 BRPM | HEART RATE: 57 BPM | BODY MASS INDEX: 28.87 KG/M2 | DIASTOLIC BLOOD PRESSURE: 69 MMHG

## 2022-10-21 DIAGNOSIS — E78.5 HYPERLIPIDEMIA LDL GOAL <100: ICD-10-CM

## 2022-10-21 DIAGNOSIS — T43.505A NEUROLEPTIC-INDUCED TARDIVE DYSKINESIA: ICD-10-CM

## 2022-10-21 DIAGNOSIS — Z09 HOSPITAL DISCHARGE FOLLOW-UP: ICD-10-CM

## 2022-10-21 DIAGNOSIS — R73.03 PREDIABETES: ICD-10-CM

## 2022-10-21 DIAGNOSIS — R19.5 POSITIVE COLORECTAL CANCER SCREENING USING COLOGUARD TEST: ICD-10-CM

## 2022-10-21 DIAGNOSIS — F20.9 CHRONIC SCHIZOPHRENIA (H): Primary | ICD-10-CM

## 2022-10-21 DIAGNOSIS — G24.01 NEUROLEPTIC-INDUCED TARDIVE DYSKINESIA: ICD-10-CM

## 2022-10-21 PROCEDURE — 99214 OFFICE O/P EST MOD 30 MIN: CPT | Performed by: FAMILY MEDICINE

## 2022-10-21 RX ORDER — GABAPENTIN 300 MG/1
300 CAPSULE ORAL 3 TIMES DAILY
COMMUNITY
End: 2023-02-06

## 2022-10-21 RX ORDER — METFORMIN HCL 500 MG
500 TABLET, EXTENDED RELEASE 24 HR ORAL
Qty: 90 TABLET | Refills: 1 | Status: SHIPPED | OUTPATIENT
Start: 2022-10-21 | End: 2023-02-06

## 2022-10-21 RX ORDER — ATORVASTATIN CALCIUM 20 MG/1
20 TABLET, FILM COATED ORAL DAILY
Qty: 90 TABLET | Refills: 1 | Status: SHIPPED | OUTPATIENT
Start: 2022-10-21 | End: 2023-02-06

## 2022-10-21 RX ORDER — QUETIAPINE FUMARATE 100 MG/1
150 TABLET, FILM COATED ORAL AT BEDTIME
COMMUNITY
End: 2023-02-06

## 2022-10-21 NOTE — PROGRESS NOTES
"1. Chronic schizophrenia (H)  2. Neuroleptic-induced tardive dyskinesia  3. Hospital discharge follow-up  This is a 61 yo female with chronic schizophrenia -- recently hospitalized for mental health \"tune up\" at [facility].  Reviewed records available to me  I have reviewed available hospital records, consults, notes, discharge summary as well as imaging and lab results.  In addition I have reviewed her medication list and made any adjustments as indicated in orders.      Patient notes she is safe in current living situation; remains relatively independent (with some medication help).      4. Hyperlipidemia LDL goal <100  H/o elevated cholesterol - takes statin - refilled Atorvastatin   - atorvastatin (LIPITOR) 20 MG tablet; Take 1 tablet (20 mg) by mouth daily  Dispense: 90 tablet; Refill: 1    5. Prediabetes  H/o prediabetes - has been stable on metformin therapy - refilled metformin   - metFORMIN (GLUCOPHAGE XR) 500 MG 24 hr tablet; Take 1 tablet (500 mg) by mouth daily before breakfast  Dispense: 90 tablet; Refill: 1    6. Positive colorectal cancer screening using Cologuard test  We spent time today discussing her recent positive colorectal cancer screening (positive Cologuard).  She needs colonoscopy follow up - she is reluctantly agreeable.  Referred.    - Adult GI  Referral - Procedure Only; Future      Subjective   Susi is a 62 year old accompanied by her self, presenting for the following health issues:  Hospital F/U      Was in the hospital  \"I guess it was mental health stuff\"  New medicine - Quetiapine, Gabapentin   Back at home -   Gets meds twice a day from support staff  Once a week - someone comes in and takes her shopping     Austedo take 12 mg qam, 6 mg at bedtime             Hospital Follow-up Visit:    Hospital/Nursing Home/IP Rehab Facility: [facility]  Date of Admission: 09/27/2022  Date of Discharge: 10/04/2022  Reason(s) for Admission: Schizophrenia    Was your " hospitalization related to COVID-19? No   Problems taking medications regularly:  None  Medication changes since discharge: None  Problems adhering to non-medication therapy:  None    Summary of hospitalization:  CareEverywhere information obtained and reviewed  Diagnostic Tests/Treatments reviewed.  Follow up needed: needs to maintain contact with her mental health providers/caregivers  Other Healthcare Providers Involved in Patient s Care:         Specialist appointment - psychiatry  Update since discharge: improved.         Plan of care communicated with patient               Review of Systems   Constitutional: Negative for activity change, appetite change, chills and fever.   HENT: Negative.    Eyes: Negative for visual disturbance.   Respiratory: Negative for cough and shortness of breath.    Cardiovascular: Negative for chest pain and peripheral edema.   Gastrointestinal: Negative for abdominal pain and hematochezia.   Endocrine: Negative.  Negative for polydipsia and polyuria.   Genitourinary: Negative.    Musculoskeletal: Positive for arthralgias.   Skin: Negative.    Allergic/Immunologic: Negative.    Neurological:        Involuntary movements (tardive dyskinesia)   Hematological: Does not bruise/bleed easily.   Psychiatric/Behavioral: Positive for mood changes. The patient is nervous/anxious.    All other systems reviewed and are negative.           Objective    /69 (BP Location: Left arm, Patient Position: Sitting, Cuff Size: Adult Regular)   Pulse 57   Resp 12   Wt 73.9 kg (163 lb)   BMI 28.87 kg/m    Body mass index is 28.87 kg/m .  Physical Exam  Vitals reviewed.   Constitutional:       General: She is not in acute distress.     Appearance: She is well-developed. She is not ill-appearing.      Comments: Frequent involuntary movements - head/extremities   HENT:      Head: Normocephalic and atraumatic.      Right Ear: Tympanic membrane and external ear normal.      Left Ear: Tympanic membrane  and external ear normal.      Nose: Nose normal.      Mouth/Throat:      Mouth: Mucous membranes are moist.      Pharynx: No oropharyngeal exudate.   Eyes:      General:         Right eye: No discharge.         Left eye: No discharge.      Extraocular Movements: Extraocular movements intact.      Conjunctiva/sclera: Conjunctivae normal.   Neck:      Thyroid: No thyromegaly.      Trachea: No tracheal deviation.   Cardiovascular:      Rate and Rhythm: Normal rate and regular rhythm.      Pulses: Normal pulses.      Heart sounds: Normal heart sounds, S1 normal and S2 normal. No murmur heard.    No friction rub. No S3 or S4 sounds.   Pulmonary:      Effort: Pulmonary effort is normal. No respiratory distress.      Breath sounds: Normal breath sounds. No wheezing or rales.   Abdominal:      General: Bowel sounds are normal.      Palpations: Abdomen is soft. There is no mass.      Tenderness: There is no abdominal tenderness.   Musculoskeletal:         General: Normal range of motion.      Cervical back: Neck supple.   Lymphadenopathy:      Cervical: No cervical adenopathy.   Skin:     General: Skin is warm and dry.      Findings: No rash.   Neurological:      General: No focal deficit present.      Mental Status: She is alert and oriented to person, place, and time.      Motor: No abnormal muscle tone.      Deep Tendon Reflexes: Reflexes are normal and symmetric.   Psychiatric:         Mood and Affect: Mood normal.            No results found for any visits on 10/21/22.

## 2022-10-23 ASSESSMENT — ENCOUNTER SYMPTOMS
APPETITE CHANGE: 0
FEVER: 0
NERVOUS/ANXIOUS: 1
SHORTNESS OF BREATH: 0
ABDOMINAL PAIN: 0
CHILLS: 0
ENDOCRINE NEGATIVE: 1
COUGH: 0
BRUISES/BLEEDS EASILY: 0
ARTHRALGIAS: 1
ALLERGIC/IMMUNOLOGIC NEGATIVE: 1
POLYDIPSIA: 0
HEMATOCHEZIA: 0
ACTIVITY CHANGE: 0

## 2023-02-06 ENCOUNTER — OFFICE VISIT (OUTPATIENT)
Dept: FAMILY MEDICINE | Facility: CLINIC | Age: 63
End: 2023-02-06
Payer: MEDICAID

## 2023-02-06 VITALS
BODY MASS INDEX: 32.07 KG/M2 | DIASTOLIC BLOOD PRESSURE: 89 MMHG | RESPIRATION RATE: 18 BRPM | HEART RATE: 76 BPM | SYSTOLIC BLOOD PRESSURE: 136 MMHG | WEIGHT: 181 LBS | OXYGEN SATURATION: 97 % | HEIGHT: 63 IN

## 2023-02-06 DIAGNOSIS — Z79.899 HIGH RISK MEDICATION USE: ICD-10-CM

## 2023-02-06 DIAGNOSIS — Z00.00 HEALTH CARE MAINTENANCE: ICD-10-CM

## 2023-02-06 DIAGNOSIS — G24.01 NEUROLEPTIC-INDUCED TARDIVE DYSKINESIA: ICD-10-CM

## 2023-02-06 DIAGNOSIS — R73.03 PREDIABETES: Primary | ICD-10-CM

## 2023-02-06 DIAGNOSIS — F51.04 PSYCHOPHYSIOLOGICAL INSOMNIA: ICD-10-CM

## 2023-02-06 DIAGNOSIS — T43.505A NEUROLEPTIC-INDUCED TARDIVE DYSKINESIA: ICD-10-CM

## 2023-02-06 DIAGNOSIS — E78.5 HYPERLIPIDEMIA LDL GOAL <100: ICD-10-CM

## 2023-02-06 DIAGNOSIS — Z12.4 CERVICAL CANCER SCREENING: ICD-10-CM

## 2023-02-06 DIAGNOSIS — F20.9 CHRONIC SCHIZOPHRENIA (H): ICD-10-CM

## 2023-02-06 DIAGNOSIS — R39.15 URINARY URGENCY: ICD-10-CM

## 2023-02-06 DIAGNOSIS — G25.71 AKATHISIA: ICD-10-CM

## 2023-02-06 LAB
ALBUMIN UR-MCNC: NEGATIVE MG/DL
ALT SERPL W P-5'-P-CCNC: 30 U/L (ref 10–35)
ANION GAP SERPL CALCULATED.3IONS-SCNC: 16 MMOL/L (ref 7–15)
APPEARANCE UR: CLEAR
BACTERIA #/AREA URNS HPF: ABNORMAL /HPF
BILIRUB UR QL STRIP: NEGATIVE
BUN SERPL-MCNC: 14.8 MG/DL (ref 8–23)
CALCIUM SERPL-MCNC: 9.9 MG/DL (ref 8.8–10.2)
CHLORIDE SERPL-SCNC: 103 MMOL/L (ref 98–107)
COLOR UR AUTO: YELLOW
CREAT SERPL-MCNC: 0.66 MG/DL (ref 0.51–0.95)
DEPRECATED HCO3 PLAS-SCNC: 22 MMOL/L (ref 22–29)
ERYTHROCYTE [DISTWIDTH] IN BLOOD BY AUTOMATED COUNT: 12.7 % (ref 10–15)
GFR SERPL CREATININE-BSD FRML MDRD: >90 ML/MIN/1.73M2
GLUCOSE SERPL-MCNC: 100 MG/DL (ref 70–99)
GLUCOSE UR STRIP-MCNC: NEGATIVE MG/DL
HBA1C MFR BLD: 5.6 % (ref 0–5.6)
HCT VFR BLD AUTO: 40.8 % (ref 35–47)
HGB BLD-MCNC: 13.5 G/DL (ref 11.7–15.7)
HGB UR QL STRIP: NEGATIVE
KETONES UR STRIP-MCNC: NEGATIVE MG/DL
LEUKOCYTE ESTERASE UR QL STRIP: NEGATIVE
MCH RBC QN AUTO: 30.9 PG (ref 26.5–33)
MCHC RBC AUTO-ENTMCNC: 33.1 G/DL (ref 31.5–36.5)
MCV RBC AUTO: 93 FL (ref 78–100)
NITRATE UR QL: NEGATIVE
PH UR STRIP: 7 [PH] (ref 5–8)
PLATELET # BLD AUTO: 329 10E3/UL (ref 150–450)
POTASSIUM SERPL-SCNC: 4.2 MMOL/L (ref 3.4–5.3)
RBC # BLD AUTO: 4.37 10E6/UL (ref 3.8–5.2)
RBC #/AREA URNS AUTO: ABNORMAL /HPF
SODIUM SERPL-SCNC: 141 MMOL/L (ref 136–145)
SP GR UR STRIP: 1.01 (ref 1–1.03)
SQUAMOUS #/AREA URNS AUTO: ABNORMAL /LPF
UROBILINOGEN UR STRIP-ACNC: 0.2 E.U./DL
WBC # BLD AUTO: 6.5 10E3/UL (ref 4–11)
WBC #/AREA URNS AUTO: ABNORMAL /HPF

## 2023-02-06 PROCEDURE — 84460 ALANINE AMINO (ALT) (SGPT): CPT | Performed by: FAMILY MEDICINE

## 2023-02-06 PROCEDURE — 80048 BASIC METABOLIC PNL TOTAL CA: CPT | Performed by: FAMILY MEDICINE

## 2023-02-06 PROCEDURE — 83036 HEMOGLOBIN GLYCOSYLATED A1C: CPT | Performed by: FAMILY MEDICINE

## 2023-02-06 PROCEDURE — 99214 OFFICE O/P EST MOD 30 MIN: CPT | Performed by: FAMILY MEDICINE

## 2023-02-06 PROCEDURE — 87086 URINE CULTURE/COLONY COUNT: CPT | Performed by: FAMILY MEDICINE

## 2023-02-06 PROCEDURE — 36415 COLL VENOUS BLD VENIPUNCTURE: CPT | Performed by: FAMILY MEDICINE

## 2023-02-06 PROCEDURE — 85027 COMPLETE CBC AUTOMATED: CPT | Performed by: FAMILY MEDICINE

## 2023-02-06 PROCEDURE — 81001 URINALYSIS AUTO W/SCOPE: CPT | Performed by: FAMILY MEDICINE

## 2023-02-06 RX ORDER — ATORVASTATIN CALCIUM 20 MG/1
20 TABLET, FILM COATED ORAL DAILY
Qty: 30 TABLET | Refills: 11 | Status: SHIPPED | OUTPATIENT
Start: 2023-02-06 | End: 2024-01-16

## 2023-02-06 RX ORDER — MULTIPLE VITAMINS W/ MINERALS TAB 9MG-400MCG
1 TAB ORAL DAILY
Qty: 30 TABLET | Refills: 11 | Status: SHIPPED | OUTPATIENT
Start: 2023-02-06 | End: 2024-05-07

## 2023-02-06 RX ORDER — GABAPENTIN 300 MG/1
300 CAPSULE ORAL 3 TIMES DAILY
Qty: 90 CAPSULE | Refills: 0 | Status: SHIPPED | OUTPATIENT
Start: 2023-02-06 | End: 2023-05-09

## 2023-02-06 RX ORDER — LANOLIN ALCOHOL/MO/W.PET/CERES
3 CREAM (GRAM) TOPICAL AT BEDTIME
Qty: 30 TABLET | Refills: 11 | Status: SHIPPED | OUTPATIENT
Start: 2023-02-06

## 2023-02-06 RX ORDER — METFORMIN HCL 500 MG
500 TABLET, EXTENDED RELEASE 24 HR ORAL
Qty: 30 TABLET | Refills: 11 | Status: SHIPPED | OUTPATIENT
Start: 2023-02-06

## 2023-02-06 RX ORDER — QUETIAPINE FUMARATE 100 MG/1
150 TABLET, FILM COATED ORAL AT BEDTIME
Qty: 45 TABLET | Refills: 0 | Status: SHIPPED | OUTPATIENT
Start: 2023-02-06 | End: 2024-05-07 | Stop reason: DRUGHIGH

## 2023-02-06 NOTE — PROGRESS NOTES
This is a 63 yo female here for follow up on chronic medical conditions - she needs refills written for her medications (because she will be moving soon).  We have reviewed / reconciled her list of medications.  Refills have been sent.      1. Prediabetes  H/o prediabetes - on Metformin therapy.  Recheck a1c  - metFORMIN (GLUCOPHAGE XR) 500 MG 24 hr tablet; Take 1 tablet (500 mg) by mouth daily before breakfast  Dispense: 30 tablet; Refill: 11  - Hemoglobin A1c; Future  - Hemoglobin A1c    2. Hyperlipidemia LDL goal <100  On Atorvastatin for hyperlipidemia - refilled -   - atorvastatin (LIPITOR) 20 MG tablet; Take 1 tablet (20 mg) by mouth daily  Dispense: 30 tablet; Refill: 11    3. Chronic schizophrenia (H)  H/o mental health issues - recent hospitalization - continue Seroquel   - QUEtiapine (SEROQUEL) 100 MG tablet; Take 1.5 tablets (150 mg) by mouth At Bedtime  Dispense: 45 tablet; Refill: 0    4. Neuroleptic-induced tardive dyskinesia  As noted - patient has been on longstanding antipsychotic medications - now with tardive dyskinesia - has seen neuro - using Austedo for movement disorder.    - deutetrabenazine (AUSTEDO) 6 MG tablet; 12 mg qam, 6 mg at bedtime  Dispense: 90 tablet; Refill: 0    5. Akathisia  As above - reviewed by Neuro - started Gabapentin.  Tolerating current dose.    - gabapentin (NEURONTIN) 300 MG capsule; Take 1 capsule (300 mg) by mouth 3 times daily  Dispense: 90 capsule; Refill: 0    6. Psychophysiological insomnia  Insomnia - multifactorial -   - melatonin 3 MG tablet; Take 1 tablet (3 mg) by mouth At Bedtime  Dispense: 30 tablet; Refill: 11    7. Urinary urgency  Complains of urinary urgency - will check UA/UC - treat if culture positive.   - UA with Microscopic - lab collect; Future  - Urine Culture Aerobic Bacterial - lab collect; Future  - UA with Microscopic - lab collect  - Urine Culture Aerobic Bacterial - lab collect  - Urine Microscopic Exam    8. High risk medication use  Labs  "ordered as patient is using high risk medication (mostly for mental health).    - Basic metabolic panel  (Ca, Cl, CO2, Creat, Gluc, K, Na, BUN); Future  - ALT; Future  - CBC with platelets; Future  - Basic metabolic panel  (Ca, Cl, CO2, Creat, Gluc, K, Na, BUN)  - ALT  - CBC with platelets    9. Cervical cancer screening  Due for cervix cancer screening - reviewed     10. Health care maintenance  Takes multivitamins -   - multivitamin w/minerals (THERA-VIT-M) tablet; Take 1 tablet by mouth daily  Dispense: 30 tablet; Refill: 11      Subjective   Susi is a 62 year old, presenting for the following health issues:  Diabetes and Lipids (/)      Is going to move -   Needs medications from [place] - so they can be packaged \"just right\"    Was in hospital - had to go to Neurology appointment   Saw neurologist - told her to stay away from neuroleptics (tardive dyskinesia)    Is at [facility]  [address] [place] [address] [address], [place]  [address] [phone] or [phone]  Fax:  [phone]    [place]  [address]  [place], [place]    [phone]    Liked [facility]  - they did her meds \"right\"  They are shutting down due to [facility] telling them they had to     Had prescription for Miralax but never used it - doesn't think she needs this        History of Present Illness       Reason for visit:  Organize my medication    She eats 2-3 servings of fruits and vegetables daily.She consumes 3 sweetened beverage(s) daily.She exercises with enough effort to increase her heart rate 60 or more minutes per day.    She is taking medications regularly.       Diabetes Follow-up      How often are you checking your blood sugar? Not at all    What concerns do you have today about your diabetes? None     Do you have any of these symptoms? (Select all that apply)  No numbness or tingling in feet.  No redness, sores or blisters on feet.  No complaints of excessive thirst.  No reports of blurry vision.  No " "significant changes to weight.      BP Readings from Last 2 Encounters:   02/06/23 136/89   10/21/22 100/69     Hemoglobin A1C (%)   Date Value   02/06/2023 5.6   08/17/2022 5.6   04/13/2021 5.6     LDL Cholesterol Calculated (mg/dL)   Date Value   08/17/2022 81   02/07/2022 80         Hyperlipidemia Follow-Up      Are you regularly taking any medication or supplement to lower your cholesterol?   Yes- Atorvastatin    Are you having muscle aches or other side effects that you think could be caused by your cholesterol lowering medication?  No        Review of Systems   Constitutional: Positive for fatigue. Negative for activity change, chills and fever.   HENT: Negative.    Eyes: Negative for visual disturbance.   Respiratory: Negative for cough and shortness of breath.    Cardiovascular: Negative for chest pain and peripheral edema.   Gastrointestinal: Negative for abdominal pain.   Endocrine: Negative for polydipsia and polyuria.   Genitourinary: Positive for dysuria and frequency.   Musculoskeletal: Positive for arthralgias.   Skin: Negative.    Allergic/Immunologic: Negative.    Neurological: Negative.    Hematological: Does not bruise/bleed easily.   Psychiatric/Behavioral: Positive for decreased concentration. The patient is nervous/anxious.    All other systems reviewed and are negative.           Objective    /89 (BP Location: Right arm, Patient Position: Sitting, Cuff Size: Adult Regular)   Pulse 76   Resp 18   Ht 1.6 m (5' 3\")   Wt 82.1 kg (181 lb)   SpO2 97%   BMI 32.06 kg/m    Body mass index is 32.06 kg/m .  Physical Exam  Vitals reviewed.   Constitutional:       General: She is not in acute distress.     Appearance: Normal appearance.   HENT:      Head: Normocephalic.      Right Ear: Tympanic membrane, ear canal and external ear normal.      Left Ear: Tympanic membrane, ear canal and external ear normal.      Nose: Nose normal.      Mouth/Throat:      Mouth: Mucous membranes are moist.      " Pharynx: No posterior oropharyngeal erythema.   Eyes:      Extraocular Movements: Extraocular movements intact.      Conjunctiva/sclera: Conjunctivae normal.      Pupils: Pupils are equal, round, and reactive to light.   Cardiovascular:      Rate and Rhythm: Normal rate and regular rhythm.      Pulses: Normal pulses.      Heart sounds: Normal heart sounds. No murmur heard.  Pulmonary:      Effort: Pulmonary effort is normal.      Breath sounds: Normal breath sounds.   Abdominal:      Palpations: Abdomen is soft. There is no mass.      Tenderness: There is no abdominal tenderness. There is no guarding or rebound.   Musculoskeletal:         General: No deformity. Normal range of motion.      Cervical back: Normal range of motion and neck supple.   Lymphadenopathy:      Cervical: No cervical adenopathy.   Skin:     General: Skin is warm and dry.   Neurological:      General: No focal deficit present.      Mental Status: She is alert.      Comments: Head movement - consistent with tardive dyskinesia     Psychiatric:         Mood and Affect: Mood normal.         Behavior: Behavior normal.            Results for orders placed or performed in visit on 02/06/23   UA with Microscopic - lab collect     Status: Normal   Result Value Ref Range    Color Urine Yellow Colorless, Straw, Light Yellow, Yellow    Appearance Urine Clear Clear    Glucose Urine Negative Negative mg/dL    Bilirubin Urine Negative Negative    Ketones Urine Negative Negative mg/dL    Specific Gravity Urine 1.015 1.005 - 1.030    Blood Urine Negative Negative    pH Urine 7.0 5.0 - 8.0    Protein Albumin Urine Negative Negative mg/dL    Urobilinogen Urine 0.2 0.2, 1.0 E.U./dL    Nitrite Urine Negative Negative    Leukocyte Esterase Urine Negative Negative   Hemoglobin A1c     Status: Normal   Result Value Ref Range    Hemoglobin A1C 5.6 0.0 - 5.6 %   Basic metabolic panel  (Ca, Cl, CO2, Creat, Gluc, K, Na, BUN)     Status: Abnormal   Result Value Ref Range     Sodium 141 136 - 145 mmol/L    Potassium 4.2 3.4 - 5.3 mmol/L    Chloride 103 98 - 107 mmol/L    Carbon Dioxide (CO2) 22 22 - 29 mmol/L    Anion Gap 16 (H) 7 - 15 mmol/L    Urea Nitrogen 14.8 8.0 - 23.0 mg/dL    Creatinine 0.66 0.51 - 0.95 mg/dL    Calcium 9.9 8.8 - 10.2 mg/dL    Glucose 100 (H) 70 - 99 mg/dL    GFR Estimate >90 >60 mL/min/1.73m2   ALT     Status: Normal   Result Value Ref Range    ALT 30 10 - 35 U/L   CBC with platelets     Status: Normal   Result Value Ref Range    WBC Count 6.5 4.0 - 11.0 10e3/uL    RBC Count 4.37 3.80 - 5.20 10e6/uL    Hemoglobin 13.5 11.7 - 15.7 g/dL    Hematocrit 40.8 35.0 - 47.0 %    MCV 93 78 - 100 fL    MCH 30.9 26.5 - 33.0 pg    MCHC 33.1 31.5 - 36.5 g/dL    RDW 12.7 10.0 - 15.0 %    Platelet Count 329 150 - 450 10e3/uL   Urine Microscopic Exam     Status: Abnormal   Result Value Ref Range    Bacteria Urine Few (A) None Seen /HPF    RBC Urine 0-2 0-2 /HPF /HPF    WBC Urine 0-5 0-5 /HPF /HPF    Squamous Epithelials Urine Few (A) None Seen /LPF   Urine Culture Aerobic Bacterial - lab collect     Status: None    Specimen: Urine, Midstream   Result Value Ref Range    Culture 10,000-50,000 CFU/mL Mixture of urogenital daniel

## 2023-02-08 LAB — BACTERIA UR CULT: NORMAL

## 2023-02-12 ASSESSMENT — ENCOUNTER SYMPTOMS
SHORTNESS OF BREATH: 0
BRUISES/BLEEDS EASILY: 0
NERVOUS/ANXIOUS: 1
DECREASED CONCENTRATION: 1
FREQUENCY: 1
FATIGUE: 1
POLYDIPSIA: 0
ABDOMINAL PAIN: 0
CHILLS: 0
ARTHRALGIAS: 1
ALLERGIC/IMMUNOLOGIC NEGATIVE: 1
FEVER: 0
COUGH: 0
DYSURIA: 1
NEUROLOGICAL NEGATIVE: 1
ACTIVITY CHANGE: 0

## 2023-05-02 ENCOUNTER — TELEPHONE (OUTPATIENT)
Dept: FAMILY MEDICINE | Facility: CLINIC | Age: 63
End: 2023-05-02
Payer: MEDICAID

## 2023-05-02 NOTE — TELEPHONE ENCOUNTER
General Call    Contacts       Type Contact Phone/Fax    05/02/2023 02:34 PM CDT Phone (Incoming) Susi Haro (Self) [phone] (M)        Reason for Call:  Request to have pap smear    What are your questions or concerns:  Patient is requesting to have pap smear done at [facility] on 05/09/23 when patient see Dr. Olvera.    Please advise.    Date of last appointment with provider: 02/06/23    Could we send this information to you in [facility] or would you prefer to receive a phone call?:   Patient would prefer a phone call   Okay to leave a detailed message?: Yes at Cell number on file:    Telephone Information:   Mobile [phone]   Mobile [phone]

## 2023-05-09 ENCOUNTER — OFFICE VISIT (OUTPATIENT)
Dept: FAMILY MEDICINE | Facility: CLINIC | Age: 63
End: 2023-05-09
Payer: MEDICAID

## 2023-05-09 VITALS
BODY MASS INDEX: 28.35 KG/M2 | RESPIRATION RATE: 18 BRPM | TEMPERATURE: 98 F | SYSTOLIC BLOOD PRESSURE: 88 MMHG | OXYGEN SATURATION: 97 % | HEIGHT: 63 IN | WEIGHT: 160 LBS | HEART RATE: 68 BPM | DIASTOLIC BLOOD PRESSURE: 60 MMHG

## 2023-05-09 DIAGNOSIS — H54.7 VISION LOSS: ICD-10-CM

## 2023-05-09 DIAGNOSIS — E78.5 HYPERLIPIDEMIA LDL GOAL <100: ICD-10-CM

## 2023-05-09 DIAGNOSIS — N89.8 VAGINAL DISCHARGE: ICD-10-CM

## 2023-05-09 DIAGNOSIS — M15.0 PRIMARY OSTEOARTHRITIS INVOLVING MULTIPLE JOINTS: ICD-10-CM

## 2023-05-09 DIAGNOSIS — R73.03 PREDIABETES: ICD-10-CM

## 2023-05-09 DIAGNOSIS — Z87.440 PERSONAL HISTORY OF URINARY TRACT INFECTION: ICD-10-CM

## 2023-05-09 DIAGNOSIS — R39.9 UTI SYMPTOMS: Primary | ICD-10-CM

## 2023-05-09 DIAGNOSIS — Z78.0 MENOPAUSE: ICD-10-CM

## 2023-05-09 DIAGNOSIS — Z23 NEED FOR COVID-19 VACCINE: ICD-10-CM

## 2023-05-09 DIAGNOSIS — Z12.4 CERVICAL CANCER SCREENING: ICD-10-CM

## 2023-05-09 LAB
ALBUMIN SERPL BCG-MCNC: 4.4 G/DL (ref 3.5–5.2)
ALBUMIN UR-MCNC: NEGATIVE MG/DL
ALP SERPL-CCNC: 91 U/L (ref 35–104)
ALT SERPL W P-5'-P-CCNC: 22 U/L (ref 10–35)
ANION GAP SERPL CALCULATED.3IONS-SCNC: 12 MMOL/L (ref 7–15)
APPEARANCE UR: CLEAR
AST SERPL W P-5'-P-CCNC: 28 U/L (ref 10–35)
BACTERIA #/AREA URNS HPF: ABNORMAL /HPF
BILIRUB SERPL-MCNC: 0.6 MG/DL
BILIRUB UR QL STRIP: NEGATIVE
BUN SERPL-MCNC: 11.4 MG/DL (ref 8–23)
CALCIUM SERPL-MCNC: 9.4 MG/DL (ref 8.8–10.2)
CHLORIDE SERPL-SCNC: 104 MMOL/L (ref 98–107)
CHOLEST SERPL-MCNC: 137 MG/DL
CLUE CELLS: ABNORMAL
COLOR UR AUTO: YELLOW
CREAT SERPL-MCNC: 0.68 MG/DL (ref 0.51–0.95)
DEPRECATED HCO3 PLAS-SCNC: 23 MMOL/L (ref 22–29)
GFR SERPL CREATININE-BSD FRML MDRD: >90 ML/MIN/1.73M2
GLUCOSE SERPL-MCNC: 92 MG/DL (ref 70–99)
GLUCOSE UR STRIP-MCNC: NEGATIVE MG/DL
HBA1C MFR BLD: 5.6 % (ref 0–5.6)
HDLC SERPL-MCNC: 63 MG/DL
HGB UR QL STRIP: ABNORMAL
KETONES UR STRIP-MCNC: NEGATIVE MG/DL
LDLC SERPL CALC-MCNC: 62 MG/DL
LEUKOCYTE ESTERASE UR QL STRIP: NEGATIVE
MUCOUS THREADS #/AREA URNS LPF: PRESENT /LPF
NITRATE UR QL: NEGATIVE
NONHDLC SERPL-MCNC: 74 MG/DL
PH UR STRIP: 6 [PH] (ref 5–8)
POTASSIUM SERPL-SCNC: 3.6 MMOL/L (ref 3.4–5.3)
PROT SERPL-MCNC: 7.1 G/DL (ref 6.4–8.3)
RBC #/AREA URNS AUTO: ABNORMAL /HPF
SODIUM SERPL-SCNC: 139 MMOL/L (ref 136–145)
SP GR UR STRIP: 1.02 (ref 1–1.03)
SQUAMOUS #/AREA URNS AUTO: ABNORMAL /LPF
TRICHOMONAS, WET PREP: ABNORMAL
TRIGL SERPL-MCNC: 61 MG/DL
UROBILINOGEN UR STRIP-ACNC: 0.2 E.U./DL
WBC #/AREA URNS AUTO: ABNORMAL /HPF
WBC'S/HIGH POWER FIELD, WET PREP: ABNORMAL
YEAST, WET PREP: ABNORMAL

## 2023-05-09 PROCEDURE — 87086 URINE CULTURE/COLONY COUNT: CPT | Performed by: FAMILY MEDICINE

## 2023-05-09 PROCEDURE — 99214 OFFICE O/P EST MOD 30 MIN: CPT | Mod: 25 | Performed by: FAMILY MEDICINE

## 2023-05-09 PROCEDURE — 80061 LIPID PANEL: CPT | Performed by: FAMILY MEDICINE

## 2023-05-09 PROCEDURE — 83036 HEMOGLOBIN GLYCOSYLATED A1C: CPT | Performed by: FAMILY MEDICINE

## 2023-05-09 PROCEDURE — 80053 COMPREHEN METABOLIC PANEL: CPT | Performed by: FAMILY MEDICINE

## 2023-05-09 PROCEDURE — 91312 COVID-19 BIVALENT 12+ (PFIZER): CPT | Performed by: FAMILY MEDICINE

## 2023-05-09 PROCEDURE — 0124A COVID-19 BIVALENT 12+ (PFIZER): CPT | Performed by: FAMILY MEDICINE

## 2023-05-09 PROCEDURE — 36415 COLL VENOUS BLD VENIPUNCTURE: CPT | Performed by: FAMILY MEDICINE

## 2023-05-09 PROCEDURE — 87210 SMEAR WET MOUNT SALINE/INK: CPT | Performed by: FAMILY MEDICINE

## 2023-05-09 PROCEDURE — 81001 URINALYSIS AUTO W/SCOPE: CPT | Performed by: FAMILY MEDICINE

## 2023-05-09 PROCEDURE — 87624 HPV HI-RISK TYP POOLED RSLT: CPT | Performed by: FAMILY MEDICINE

## 2023-05-09 PROCEDURE — G0145 SCR C/V CYTO,THINLAYER,RESCR: HCPCS | Performed by: FAMILY MEDICINE

## 2023-05-09 RX ORDER — AMOXICILLIN 500 MG
1200 CAPSULE ORAL DAILY
Qty: 180 CAPSULE | Refills: 3 | Status: SHIPPED | OUTPATIENT
Start: 2023-05-09

## 2023-05-09 RX ORDER — MELATON/B.COHOSH/VALERIAN/HOPS 3 MG-40 MG
1 CAPSULE ORAL DAILY
Qty: 90 CAPSULE | Refills: 1 | Status: SHIPPED | OUTPATIENT
Start: 2023-05-09

## 2023-05-09 RX ORDER — GLUCOSAM/CHON-MSM1/C/MANG/BOSW 750-644 MG
1200 TABLET ORAL 2 TIMES DAILY
Qty: 180 TABLET | Refills: 3 | Status: SHIPPED | OUTPATIENT
Start: 2023-05-09

## 2023-05-09 RX ORDER — ANTIOX #8/OM3/DHA/EPA/LUT/ZEAX 250-2.5 MG
1 CAPSULE ORAL 2 TIMES DAILY
Qty: 180 CAPSULE | Refills: 3 | Status: SHIPPED | OUTPATIENT
Start: 2023-05-09

## 2023-05-09 ASSESSMENT — ENCOUNTER SYMPTOMS
NERVOUS/ANXIOUS: 1
POLYDIPSIA: 0
FEVER: 0
ACTIVITY CHANGE: 0
ARTHRALGIAS: 1
CHILLS: 0
DYSURIA: 1
ABDOMINAL PAIN: 0
COUGH: 0

## 2023-05-09 NOTE — PROGRESS NOTES
Prior to immunization administration, verified patients identity using patient s name and date of birth. Please see Immunization Activity for additional information.     Screening Questionnaire for Adult Immunization    Are you sick today?   No   Do you have allergies to medications, food, a vaccine component or latex?   Yes   Have you ever had a serious reaction after receiving a vaccination?   No   Do you have a long-term health problem with heart, lung, kidney, or metabolic disease (e.g., diabetes), asthma, a blood disorder, no spleen, complement component deficiency, a cochlear implant, or a spinal fluid leak?  Are you on long-term aspirin therapy?   Yes   Do you have cancer, leukemia, HIV/AIDS, or any other immune system problem?   No   Do you have a parent, brother, or sister with an immune system problem?   Don't Know   In the past 3 months, have you taken medications that affect  your immune system, such as prednisone, other steroids, or anticancer drugs; drugs for the treatment of rheumatoid arthritis, Crohn s disease, or psoriasis; or have you had radiation treatments?   No   Have you had a seizure, or a brain or other nervous system problem?   No   During the past year, have you received a transfusion of blood or blood    products, or been given immune (gamma) globulin or antiviral drug?   No   For women: Are you pregnant or is there a chance you could become       pregnant during the next month?   No   Have you received any vaccinations in the past 4 weeks?   No     Immunization questionnaire was positive for at least one answer.  Notified Dr. Olvera.        Screening performed by Claudia Dickerson CMA on 5/9/2023 at 8:46 AM.

## 2023-05-09 NOTE — PROGRESS NOTES
1. UTI symptoms  Patient presents with dysuria/urinary frequency - UA/UC was sent - patient's UA/micro was evaluated and appears negative.  Will check culture.    - UA with Microscopic reflex to Culture; Future  - UA with Microscopic reflex to Culture  - UA Microscopic with Reflex to Culture  - Urine Culture Aerobic Bacterial; Future  - Urine Culture Aerobic Bacterial  - Wet prep - Clinic Collect    2. Cervical cancer screening  Due for cervix cancer screening - pap/HPV done/sent.    - Pap Screen with HPV - recommended age 30 - 65 years    3. Hyperlipidemia LDL goal <100  Patient has h/o hyperlipidemia and elevated sugars; does not want statin therapy.  Will renew her fish oil (has been buying OTC).  Check lipids.    - Omega-3 Fatty Acids (FISH OIL) 1200 MG capsule; Take 1 capsule (1,200 mg) by mouth daily  Dispense: 180 capsule; Refill: 3  - Lipid Profile (Chol, Trig, HDL, LDL calc); Future  - Comprehensive metabolic panel (BMP + Alb, Alk Phos, ALT, AST, Total. Bili, TP); Future  - Lipid Profile (Chol, Trig, HDL, LDL calc)  - Comprehensive metabolic panel (BMP + Alb, Alk Phos, ALT, AST, Total. Bili, TP)    4. Vision loss  Patient has difficulty with vision - was recommended to take AREDS 2 caps - renewed   - Multiple Vitamins-Minerals (PRESERVISION AREDS 2) CAPS; Take 1 capsule by mouth 2 times daily  Dispense: 180 capsule; Refill: 3    5. Menopause  Patient with menopause symptoms - uses Estroven for symptom relief.    - Black Cohosh-SoyIsoflav-Magnol (ESTROVEN MENOPAUSE RELIEF) CAPS; Take 1 capsule by mouth daily  Dispense: 90 capsule; Refill: 1    6. Personal history of urinary tract infection  Patient has h/o recurrent UTIs - takes cranberry tablets - renewed.    - Cranberry 500 MG TABS; Take 500 mg by mouth daily  Dispense: 90 tablet; Refill: 3    7. Primary osteoarthritis involving multiple joints  DJD - knees/hips/hands - will refill Osteo Bi-flex (glucosamine/chondroitin) -   - Misc Natural Products (OSTEO  BI-FLEX ADV TRIPLE ST) TABS; Take 1,200 mg by mouth 2 times daily  Dispense: 180 tablet; Refill: 3    8. Prediabetes  H/o prediabetes - takes Metformin.  Check A1c.    - Hemoglobin A1c; Future  - Comprehensive metabolic panel (BMP + Alb, Alk Phos, ALT, AST, Total. Bili, TP); Future  - Hemoglobin A1c  - Comprehensive metabolic panel (BMP + Alb, Alk Phos, ALT, AST, Total. Bili, TP)    9. Need for COVID-19 vaccine  Desires COVID-19 booster - ordered   - COVID-19 BIVALENT 12+ (PFIZER)    10. Vaginal discharge  Vaginal discharge on exam - may be cause of underlying symptoms - will check wet prep.    - Wet prep - Clinic Collect      Subjective   Susi is a 62 year old, presenting for the following health issues:  UTI, Gyn Exam, and Medication Request (Wants some supplements sent in for her-she has a list)        5/9/2023     8:42 AM   Additional Questions   Roomed by Claudia     Urinary frequency -   Dysuria    Needs products prescription -     UTI  Associated symptoms include arthralgias. Pertinent negatives include no abdominal pain, chest pain, chills, coughing or fever.   History of Present Illness       Reason for visit:  Possible Urinary Tract Infection, PAP Smear  Symptom onset:  1-2 weeks ago  Symptoms include:  Urinary urgency and frequency  Symptom intensity:  Mild  Symptom progression:  Staying the same  Had these symptoms before:  No  What makes it worse:  N/A  What makes it better:  Cranberry tablets                Review of Systems   Constitutional: Negative for activity change, chills and fever.   HENT: Negative.    Eyes: Negative for visual disturbance.   Respiratory: Negative for cough.    Cardiovascular: Negative for chest pain.   Gastrointestinal: Negative for abdominal pain.   Endocrine: Negative for polydipsia and polyuria.   Genitourinary: Positive for dysuria, urgency and vaginal discharge.   Musculoskeletal: Positive for arthralgias.   Skin: Negative.    Psychiatric/Behavioral: Positive for mood  "changes. The patient is nervous/anxious.    All other systems reviewed and are negative.           Objective    BP (!) 88/60 (BP Location: Left arm, Patient Position: Sitting, Cuff Size: Adult Regular)   Pulse 68   Temp 98  F (36.7  C) (Temporal)   Resp 18   Ht 1.6 m (5' 3\")   Wt 72.6 kg (160 lb)   SpO2 97%   BMI 28.34 kg/m    Body mass index is 28.34 kg/m .  Physical Exam  Vitals reviewed.   Constitutional:       General: She is not in acute distress.     Appearance: Normal appearance.   HENT:      Head: Normocephalic.      Right Ear: Tympanic membrane, ear canal and external ear normal.      Left Ear: Tympanic membrane, ear canal and external ear normal.      Nose: Nose normal.      Mouth/Throat:      Mouth: Mucous membranes are moist.      Pharynx: No posterior oropharyngeal erythema.   Eyes:      Extraocular Movements: Extraocular movements intact.      Conjunctiva/sclera: Conjunctivae normal.      Pupils: Pupils are equal, round, and reactive to light.   Cardiovascular:      Rate and Rhythm: Normal rate and regular rhythm.      Pulses: Normal pulses.      Heart sounds: Normal heart sounds. No murmur heard.  Pulmonary:      Effort: Pulmonary effort is normal.      Breath sounds: Normal breath sounds.   Abdominal:      Palpations: Abdomen is soft. There is no mass.      Tenderness: There is no abdominal tenderness. There is no right CVA tenderness, left CVA tenderness, guarding or rebound.   Genitourinary:     Comments: PELVIC EXAM:External genitalia: normal  Vaginal mucosa normal  Vaginal discharge: sm amt white  Speculum exam shows a normal appearing cervix .       Musculoskeletal:         General: No deformity. Normal range of motion.      Cervical back: Normal range of motion and neck supple.   Lymphadenopathy:      Cervical: No cervical adenopathy.   Skin:     General: Skin is warm and dry.   Neurological:      General: No focal deficit present.      Mental Status: She is alert.   Psychiatric:         " Mood and Affect: Mood normal.         Behavior: Behavior normal.            Results for orders placed or performed in visit on 05/09/23 (from the past 24 hour(s))   UA with Microscopic reflex to Culture    Specimen: Urine, Midstream   Result Value Ref Range    Color Urine Yellow Colorless, Straw, Light Yellow, Yellow    Appearance Urine Clear Clear    Glucose Urine Negative Negative mg/dL    Bilirubin Urine Negative Negative    Ketones Urine Negative Negative mg/dL    Specific Gravity Urine 1.020 1.005 - 1.030    Blood Urine Trace (A) Negative    pH Urine 6.0 5.0 - 8.0    Protein Albumin Urine Negative Negative mg/dL    Urobilinogen Urine 0.2 0.2, 1.0 E.U./dL    Nitrite Urine Negative Negative    Leukocyte Esterase Urine Negative Negative   UA Microscopic with Reflex to Culture   Result Value Ref Range    Bacteria Urine Few (A) None Seen /HPF    RBC Urine 0-2 0-2 /HPF /HPF    WBC Urine 0-5 0-5 /HPF /HPF    Squamous Epithelials Urine Few (A) None Seen /LPF    Mucus Urine Present (A) None Seen /LPF    Narrative    Urine Culture not indicated   Lipid Profile (Chol, Trig, HDL, LDL calc)   Result Value Ref Range    Cholesterol 137 <200 mg/dL    Triglycerides 61 <150 mg/dL    Direct Measure HDL 63 >=50 mg/dL    LDL Cholesterol Calculated 62 <=100 mg/dL    Non HDL Cholesterol 74 <130 mg/dL    Narrative    Cholesterol  Desirable:  <200 mg/dL    Triglycerides  Normal:  Less than 150 mg/dL  Borderline High:  150-199 mg/dL  High:  200-499 mg/dL  Very High:  Greater than or equal to 500 mg/dL    Direct Measure HDL  Female:  Greater than or equal to 50 mg/dL   Male:  Greater than or equal to 40 mg/dL    LDL Cholesterol  Desirable:  <100mg/dL  Above Desirable:  100-129 mg/dL   Borderline High:  130-159 mg/dL   High:  160-189 mg/dL   Very High:  >= 190 mg/dL    Non HDL Cholesterol  Desirable:  130 mg/dL  Above Desirable:  130-159 mg/dL  Borderline High:  160-189 mg/dL  High:  190-219 mg/dL  Very High:  Greater than or equal to 220  mg/dL   Hemoglobin A1c   Result Value Ref Range    Hemoglobin A1C 5.6 0.0 - 5.6 %   Comprehensive metabolic panel (BMP + Alb, Alk Phos, ALT, AST, Total. Bili, TP)   Result Value Ref Range    Sodium 139 136 - 145 mmol/L    Potassium 3.6 3.4 - 5.3 mmol/L    Chloride 104 98 - 107 mmol/L    Carbon Dioxide (CO2) 23 22 - 29 mmol/L    Anion Gap 12 7 - 15 mmol/L    Urea Nitrogen 11.4 8.0 - 23.0 mg/dL    Creatinine 0.68 0.51 - 0.95 mg/dL    Calcium 9.4 8.8 - 10.2 mg/dL    Glucose 92 70 - 99 mg/dL    Alkaline Phosphatase 91 35 - 104 U/L    AST 28 10 - 35 U/L    ALT 22 10 - 35 U/L    Protein Total 7.1 6.4 - 8.3 g/dL    Albumin 4.4 3.5 - 5.2 g/dL    Bilirubin Total 0.6 <=1.2 mg/dL    GFR Estimate >90 >60 mL/min/1.73m2   Wet prep - Clinic Collect    Specimen: Vagina; Swab   Result Value Ref Range    Trichomonas Absent Absent    Yeast Absent Absent    Clue Cells Absent Absent    WBCs/high power field 3+ (A) None

## 2023-05-11 ENCOUNTER — NURSE TRIAGE (OUTPATIENT)
Dept: NURSING | Facility: CLINIC | Age: 63
End: 2023-05-11
Payer: MEDICAID

## 2023-05-11 LAB — BACTERIA UR CULT: NORMAL

## 2023-05-11 NOTE — TELEPHONE ENCOUNTER
"Patient calling. She wants to know if she needs an antibiotic    Note from Dr. Garcia as follows read to the patient \"Final urine culture is back and doesn't show any infection that we would need to treat!\"    Patient also enquiring about her A1c, and message from Dr. May Barnett read to her as follows: \"Your labs all look great!  No sign of infection.   Your blood sugars are good.  Your cholesterol numbers are great! \"    Jody Turcios RN  [facility]  May 11, 2023, 5:48 PM    Reason for Disposition    NEGATIVE urine test (i.e., NI - and LE - and WBC < 10)    Additional Information    Negative: [1] Diagnosed urine infection AND [2] female taking antibiotic    Negative: [1] Diagnosed urine infection AND [2] male taking antibiotic    Negative: Patient sounds very sick or weak to the triager    Negative: [1] POSITIVE urine test (i.e., NI + or LE+ or WBC > 10) AND [2] fever > 100.4 F (38.0 C)    Negative: [1] POSITIVE urine test AND [2] side (flank) or lower back pain present    Negative: [1] POSITIVE urine test AND [2] pregnant    Negative: [1] NEGATIVE urine test (i.e., NI - and LE - and WBC < 10) AND [2] urine symptoms continue or are  worsening    Negative: [1] POSITIVE urine test (i.e., NI + or LE + or WBC > 10) AND [2] NO standing order to call in prescription for antibiotic    Negative: [1] POSITIVE urine test AND [2] antibiotic treatment in past month for urine infection    Negative: [1] POSITIVE urine test AND [2] has urinary catheter (e.g., Don)    Negative: [1] POSITIVE  urine test AND [2] bedridden (e.g., nursing home patient, CVA, chronic illness, recovering from surgery)    Negative: [1] POSITIVE  urine test AND [2] chronic urinary incontinence (loss of bladder control)    Negative: [1] POSITIVE urine test AND [2] diabetes mellitus or weak immune system (e.g., HIV positive, cancer chemo, splenectomy, organ transplant, chronic steroids)    Negative: [1] POSITIVE urine test " AND [2] male    Negative: Triager uncertain how to interpret urine test results    Negative: [1] NEGATIVE urine test (i.e., NI - and LE - and WBC < 10) AND [2] urine with > 5 RBC / HPF (Exception: menstruating now)    Negative: [1] POSITIVE urine test (i.e., NI + or LE + or WBC > 10) AND [2] standing order to call in prescription for antibiotic    Protocols used: URINALYSIS RESULTS FOLLOW-UP CALL-A-AH

## 2023-05-12 LAB
BKR LAB AP GYN ADEQUACY: NORMAL
BKR LAB AP GYN INTERPRETATION: NORMAL
BKR LAB AP HPV REFLEX: NORMAL
BKR LAB AP PREVIOUS ABNORMAL: NORMAL
PATH REPORT.COMMENTS IMP SPEC: NORMAL
PATH REPORT.COMMENTS IMP SPEC: NORMAL
PATH REPORT.RELEVANT HX SPEC: NORMAL

## 2023-05-15 LAB
HUMAN PAPILLOMA VIRUS 16 DNA: NEGATIVE
HUMAN PAPILLOMA VIRUS 18 DNA: NEGATIVE
HUMAN PAPILLOMA VIRUS FINAL DIAGNOSIS: NORMAL
HUMAN PAPILLOMA VIRUS OTHER HR: NEGATIVE

## 2023-10-15 ENCOUNTER — HEALTH MAINTENANCE LETTER (OUTPATIENT)
Age: 63
End: 2023-10-15

## 2023-11-30 ENCOUNTER — OFFICE VISIT (OUTPATIENT)
Dept: URGENT CARE | Facility: URGENT CARE | Age: 63
End: 2023-11-30
Payer: MEDICAID

## 2023-11-30 VITALS
SYSTOLIC BLOOD PRESSURE: 115 MMHG | HEIGHT: 62 IN | TEMPERATURE: 97.7 F | HEART RATE: 67 BPM | WEIGHT: 155 LBS | DIASTOLIC BLOOD PRESSURE: 73 MMHG | BODY MASS INDEX: 28.52 KG/M2 | OXYGEN SATURATION: 98 %

## 2023-11-30 DIAGNOSIS — R35.0 URINARY FREQUENCY: Primary | ICD-10-CM

## 2023-11-30 LAB
ALBUMIN UR-MCNC: NEGATIVE MG/DL
APPEARANCE UR: CLEAR
BILIRUB UR QL STRIP: NEGATIVE
CLUE CELLS: NORMAL
COLOR UR AUTO: YELLOW
GLUCOSE UR STRIP-MCNC: NEGATIVE MG/DL
HGB UR QL STRIP: NEGATIVE
KETONES UR STRIP-MCNC: NEGATIVE MG/DL
LEUKOCYTE ESTERASE UR QL STRIP: NEGATIVE
NITRATE UR QL: NEGATIVE
PH UR STRIP: 6 [PH] (ref 5–7)
SP GR UR STRIP: 1.02 (ref 1–1.03)
TRICHOMONAS, WET PREP: NORMAL
UROBILINOGEN UR STRIP-ACNC: 0.2 E.U./DL
WBC'S/HIGH POWER FIELD, WET PREP: NORMAL
YEAST, WET PREP: NORMAL

## 2023-11-30 PROCEDURE — 87086 URINE CULTURE/COLONY COUNT: CPT | Performed by: PHYSICIAN ASSISTANT

## 2023-11-30 PROCEDURE — 81003 URINALYSIS AUTO W/O SCOPE: CPT

## 2023-11-30 PROCEDURE — 99213 OFFICE O/P EST LOW 20 MIN: CPT | Performed by: PHYSICIAN ASSISTANT

## 2023-11-30 PROCEDURE — 87210 SMEAR WET MOUNT SALINE/INK: CPT | Performed by: PHYSICIAN ASSISTANT

## 2023-11-30 NOTE — PROGRESS NOTES
Assessment & Plan     Urinary frequency    -UA is negative for acute cystitis.  Wet prep is negative for yeast, bacterial vaginosis, trichomonas.  I have referred patient to urology for further evaluation of urine frequency.  - UA Macroscopic with reflex to Microscopic and Culture - Clinic Collect  - Urine Culture Aerobic Bacterial - lab collect  - Wet prep - Clinic Collect  - Adult Uro/Gyn  Referral     Results for orders placed or performed in visit on 11/30/23   UA Macroscopic with reflex to Microscopic and Culture - Clinic Collect     Status: Normal    Specimen: Urine, Midstream   Result Value Ref Range    Color Urine Yellow Colorless, Straw, Light Yellow, Yellow    Appearance Urine Clear Clear    Glucose Urine Negative Negative mg/dL    Bilirubin Urine Negative Negative    Ketones Urine Negative Negative mg/dL    Specific Gravity Urine 1.025 1.003 - 1.035    Blood Urine Negative Negative    pH Urine 6.0 5.0 - 7.0    Protein Albumin Urine Negative Negative mg/dL    Urobilinogen Urine 0.2 0.2, 1.0 E.U./dL    Nitrite Urine Negative Negative    Leukocyte Esterase Urine Negative Negative    Narrative    Microscopic not indicated       Patient Instructions   I have referred you to Urology for urinary frequency  Urine test is negative for bladder infection  I have ordered urine culture which is pending    Return for Follow up with Urology.    At the end of the encounter, I discussed results, diagnosis, medications. Discussed red flags for immediate return to clinic/ER, as well as indications for follow up if no improvement. Patient understood and agreed to plan. Patient was stable for discharge.    Enid Goldman is a 63 year old female who presents to clinic today  for the following health issues:  Chief Complaint   Patient presents with    Urgent Care    UTI     Frequent need to urinate     HPI    Patient reports urinary frequency and urgency x 2 weeks. No vaginal discharge or bladder spasms.  "Denies fever and chills.    Review of Systems    Problem List:  2023-05: Prediabetes  2023-05: Hyperlipidemia LDL goal <100  2023-05: Primary osteoarthritis involving multiple joints  2023-05: Menopause  2023-05: Personal history of urinary tract infection  2022-05: Intolerance to heat  2022-05: High risk medication use  2019-07: Deformity of left thumb joint  2018-06: Atypical squamous cells of undetermined significance on   cytologic smear of cervix (ASC-US)  2017-08: Low back pain  2017-04: Chronic schizophrenia (H)  2016-07: Onychauxis  2015-09: Family history of diabetes mellitus  2015-07: Neuroleptic-induced tardive dyskinesia  2015-01: Abnormal involuntary movements  2015-01: Akathisia  2013-12: Abnormal finding  2011-04: Low grade squamous intraepithelial lesion (LGSIL) on   Papanicolaou smear of cervix  2011-03: PTSD (post-traumatic stress disorder)  2008-12: Cervicalgia  2008-12: Insomnia  2008-12: Atypical bipolar affective disorder (H)  2008-12: Schizoaffective disorder-chronic with exacerbation (H)  2008-11: Metatarsal fracture      Past Medical History:   Diagnosis Date    Failed bunionectomy        Social History     Tobacco Use    Smoking status: Never    Smokeless tobacco: Never   Substance Use Topics    Alcohol use: Not on file           Objective    /73   Pulse 67   Temp 97.7  F (36.5  C) (Temporal)   Ht 1.575 m (5' 2\")   Wt 70.3 kg (155 lb)   SpO2 98%   BMI 28.35 kg/m    Physical Exam  Vitals and nursing note reviewed.   Cardiovascular:      Rate and Rhythm: Normal rate and regular rhythm.   Pulmonary:      Effort: Pulmonary effort is normal.      Breath sounds: Normal breath sounds.   Abdominal:      General: Abdomen is flat.      Palpations: Abdomen is soft.      Tenderness: There is no abdominal tenderness. There is no right CVA tenderness or left CVA tenderness.   Lymphadenopathy:      Cervical: No cervical adenopathy.   Skin:     General: Skin is warm and dry.      Findings: No " rash.   Neurological:      General: No focal deficit present.      Mental Status: She is alert and oriented to person, place, and time.   Psychiatric:         Mood and Affect: Mood normal.         Behavior: Behavior normal.              Carolyn Richey PA-C

## 2023-11-30 NOTE — PATIENT INSTRUCTIONS
I have referred you to Urology for urinary frequency  Urine test is negative for bladder infection  I have ordered urine culture which is pending

## 2023-12-02 LAB — BACTERIA UR CULT: NORMAL

## 2024-01-16 DIAGNOSIS — E78.5 HYPERLIPIDEMIA LDL GOAL <100: ICD-10-CM

## 2024-01-16 RX ORDER — ATORVASTATIN CALCIUM 20 MG/1
20 TABLET, FILM COATED ORAL DAILY
Qty: 30 TABLET | Refills: 1 | Status: SHIPPED | OUTPATIENT
Start: 2024-01-16 | End: 2024-03-12

## 2024-02-13 DIAGNOSIS — Z00.00 HEALTH CARE MAINTENANCE: Primary | ICD-10-CM

## 2024-02-13 RX ORDER — MULTIVITAMIN,THERAPEUTIC
1 TABLET ORAL DAILY
Qty: 30 TABLET | Refills: 6 | Status: SHIPPED | OUTPATIENT
Start: 2024-02-13 | End: 2024-09-10

## 2024-03-12 DIAGNOSIS — E78.5 HYPERLIPIDEMIA LDL GOAL <100: ICD-10-CM

## 2024-03-12 RX ORDER — ATORVASTATIN CALCIUM 20 MG/1
20 TABLET, FILM COATED ORAL DAILY
Qty: 30 TABLET | Refills: 2 | Status: SHIPPED | OUTPATIENT
Start: 2024-03-12 | End: 2024-06-18

## 2024-03-19 ENCOUNTER — OFFICE VISIT (OUTPATIENT)
Dept: FAMILY MEDICINE | Facility: CLINIC | Age: 64
End: 2024-03-19
Payer: MEDICAID

## 2024-03-19 VITALS
HEART RATE: 63 BPM | OXYGEN SATURATION: 94 % | RESPIRATION RATE: 20 BRPM | WEIGHT: 167 LBS | HEIGHT: 62 IN | BODY MASS INDEX: 30.73 KG/M2 | DIASTOLIC BLOOD PRESSURE: 67 MMHG | TEMPERATURE: 97.3 F | SYSTOLIC BLOOD PRESSURE: 102 MMHG

## 2024-03-19 DIAGNOSIS — R39.15 URINARY URGENCY: Primary | ICD-10-CM

## 2024-03-19 DIAGNOSIS — R73.03 PREDIABETES: ICD-10-CM

## 2024-03-19 DIAGNOSIS — R39.9 UTI SYMPTOMS: ICD-10-CM

## 2024-03-19 LAB
ALBUMIN UR-MCNC: NEGATIVE MG/DL
APPEARANCE UR: CLEAR
BACTERIA #/AREA URNS HPF: NORMAL /HPF
BILIRUB UR QL STRIP: NEGATIVE
COLOR UR AUTO: YELLOW
GLUCOSE UR STRIP-MCNC: NEGATIVE MG/DL
HBA1C MFR BLD: 5.8 % (ref 0–5.6)
HGB UR QL STRIP: NEGATIVE
KETONES UR STRIP-MCNC: NEGATIVE MG/DL
LEUKOCYTE ESTERASE UR QL STRIP: NEGATIVE
NITRATE UR QL: NEGATIVE
PH UR STRIP: 5.5 [PH] (ref 5–8)
RBC #/AREA URNS AUTO: NORMAL /HPF
SP GR UR STRIP: 1.02 (ref 1–1.03)
UROBILINOGEN UR STRIP-ACNC: 0.2 E.U./DL
WBC #/AREA URNS AUTO: NORMAL /HPF

## 2024-03-19 PROCEDURE — 83036 HEMOGLOBIN GLYCOSYLATED A1C: CPT | Performed by: FAMILY MEDICINE

## 2024-03-19 PROCEDURE — 87086 URINE CULTURE/COLONY COUNT: CPT | Performed by: FAMILY MEDICINE

## 2024-03-19 PROCEDURE — 99213 OFFICE O/P EST LOW 20 MIN: CPT | Performed by: FAMILY MEDICINE

## 2024-03-19 PROCEDURE — 81001 URINALYSIS AUTO W/SCOPE: CPT | Performed by: FAMILY MEDICINE

## 2024-03-19 PROCEDURE — 36415 COLL VENOUS BLD VENIPUNCTURE: CPT | Performed by: FAMILY MEDICINE

## 2024-03-19 RX ORDER — CEPHALEXIN 500 MG/1
500 CAPSULE ORAL 2 TIMES DAILY
Qty: 10 CAPSULE | Refills: 0 | Status: SHIPPED | OUTPATIENT
Start: 2024-03-19 | End: 2024-03-24

## 2024-03-19 RX ORDER — SOLIFENACIN SUCCINATE 5 MG/1
5 TABLET, FILM COATED ORAL DAILY
Qty: 30 TABLET | Refills: 3 | Status: SHIPPED | OUTPATIENT
Start: 2024-03-19 | End: 2024-05-07

## 2024-03-19 RX ORDER — CITALOPRAM HYDROBROMIDE 10 MG/1
10 TABLET ORAL DAILY
COMMUNITY
Start: 2024-03-12

## 2024-03-19 ASSESSMENT — PATIENT HEALTH QUESTIONNAIRE - PHQ9
SUM OF ALL RESPONSES TO PHQ QUESTIONS 1-9: 11
10. IF YOU CHECKED OFF ANY PROBLEMS, HOW DIFFICULT HAVE THESE PROBLEMS MADE IT FOR YOU TO DO YOUR WORK, TAKE CARE OF THINGS AT HOME, OR GET ALONG WITH OTHER PEOPLE: SOMEWHAT DIFFICULT
SUM OF ALL RESPONSES TO PHQ QUESTIONS 1-9: 11

## 2024-03-19 NOTE — PROGRESS NOTES
1. Urinary urgency  Patient complains of urinary urgency - more likely irritable bladder, not infection (it has been going on for months/years).  Will check UA/UC - and start Vesicare for irritable bladder.    - UA with Microscopic reflex to Culture  - Urine Culture Aerobic Bacterial; Future  - solifenacin (VESICARE) 5 MG tablet; Take 1 tablet (5 mg) by mouth daily  Dispense: 30 tablet; Refill: 3  - Urine Culture Aerobic Bacterial  - UA Microscopic with Reflex to Culture    2. Prediabetes  Prediabetes - check A1c - may result in urinary frequency   - Hemoglobin A1c; Future  - Hemoglobin A1c    3. UTI symptoms  UTI symptoms - less likely acute infection, but patient is leaving town, so will send Cephalexin rx with her, in case her Uc is positive.    - cephALEXin (KEFLEX) 500 MG capsule; Take 1 capsule (500 mg) by mouth 2 times daily for 5 days  Dispense: 10 capsule; Refill: 0      Subjective   Susi is a 63 year old, presenting for the following health issues:  Urinary Problem        3/19/2024     2:45 PM   Additional Questions   Roomed by Claudia     Feels like she has to go to the bathroom all the time  Antibiotics helped    Psychiatrist thinks the muscle isn't working and she needs Oxybutynin patch   Has appointment with Urologist in 2 months -     Goes to bathroom every few minutes -   Wants to go on vacation and hasn't been on vacation x 30 years  Going with brother - to [place] - niece has a few young kids - will go with them   In a couple days       History of Present Illness       Reason for visit:  Urine infection and a1c test  Symptom onset:  3-4 weeks ago  Symptom intensity:  Severe  Symptom progression:  Worsening  Had these symptoms before:  Yes  Has tried/received treatment for these symptoms:  Yes  Previous treatment was successful:  Yes    She eats 2-3 servings of fruits and vegetables daily.She consumes 1 sweetened beverage(s) daily.She exercises with enough effort to increase her heart rate 60  "or more minutes per day.  She exercises with enough effort to increase her heart rate 6 days per week.   She is taking medications regularly.       Review of Systems   Constitutional:  Negative for chills and fever.   HENT:  Negative for ear pain and sore throat.    Eyes:  Negative for pain and visual disturbance.   Respiratory:  Negative for cough and shortness of breath.    Cardiovascular:  Negative for chest pain and palpitations.   Gastrointestinal:  Negative for abdominal pain and vomiting.   Genitourinary:  Positive for frequency and urgency. Negative for dysuria and hematuria.   Musculoskeletal:  Negative for arthralgias and back pain.   Skin:  Negative for color change and rash.   Neurological:  Positive for headaches. Negative for seizures and syncope.   Psychiatric/Behavioral:  Positive for dysphoric mood. The patient is nervous/anxious.    All other systems reviewed and are negative.          Objective    /67 (BP Location: Right arm, Patient Position: Sitting, Cuff Size: Adult Large)   Pulse 63   Temp 97.3  F (36.3  C) (Oral)   Resp 20   Ht 1.57 m (5' 1.81\")   Wt 75.8 kg (167 lb)   LMP  (LMP Unknown)   SpO2 94%   BMI 30.73 kg/m    Body mass index is 30.73 kg/m .  Physical Exam  Vitals reviewed.   Constitutional:       General: She is not in acute distress.     Appearance: Normal appearance.   HENT:      Head: Normocephalic.      Right Ear: Tympanic membrane, ear canal and external ear normal.      Left Ear: Tympanic membrane, ear canal and external ear normal.      Nose: Nose normal.      Mouth/Throat:      Mouth: Mucous membranes are moist.      Pharynx: No posterior oropharyngeal erythema.   Eyes:      Extraocular Movements: Extraocular movements intact.      Conjunctiva/sclera: Conjunctivae normal.      Pupils: Pupils are equal, round, and reactive to light.   Cardiovascular:      Rate and Rhythm: Normal rate and regular rhythm.      Pulses: Normal pulses.      Heart sounds: Normal heart " sounds. No murmur heard.  Pulmonary:      Effort: Pulmonary effort is normal.      Breath sounds: Normal breath sounds.   Abdominal:      Palpations: Abdomen is soft. There is no mass.      Tenderness: There is no abdominal tenderness. There is no right CVA tenderness, left CVA tenderness, guarding or rebound.   Musculoskeletal:         General: No deformity. Normal range of motion.      Cervical back: Normal range of motion and neck supple.   Lymphadenopathy:      Cervical: No cervical adenopathy.   Skin:     General: Skin is warm and dry.   Neurological:      General: No focal deficit present.      Mental Status: She is alert.   Psychiatric:         Mood and Affect: Mood normal.      Comments: Sl disorganized thoughts            Results for orders placed or performed in visit on 03/19/24   UA with Microscopic reflex to Culture     Status: Normal    Specimen: Urine, Clean Catch   Result Value Ref Range    Color Urine Yellow Colorless, Straw, Light Yellow, Yellow    Appearance Urine Clear Clear    Glucose Urine Negative Negative mg/dL    Bilirubin Urine Negative Negative    Ketones Urine Negative Negative mg/dL    Specific Gravity Urine 1.020 1.005 - 1.030    Blood Urine Negative Negative    pH Urine 5.5 5.0 - 8.0    Protein Albumin Urine Negative Negative mg/dL    Urobilinogen Urine 0.2 0.2, 1.0 E.U./dL    Nitrite Urine Negative Negative    Leukocyte Esterase Urine Negative Negative   Hemoglobin A1c     Status: Abnormal   Result Value Ref Range    Hemoglobin A1C 5.8 (H) 0.0 - 5.6 %   UA Microscopic with Reflex to Culture     Status: Normal   Result Value Ref Range    Bacteria Urine None Seen None Seen /HPF    RBC Urine None Seen 0-2 /HPF /HPF    WBC Urine 0-5 0-5 /HPF /HPF   Urine Culture Aerobic Bacterial     Status: None    Specimen: Urine, Clean Catch   Result Value Ref Range    Culture <10,000 CFU/mL Mixture of Urogenital Gosia            Signed Electronically by: DOV ESPITIA MD      Prior to  immunization administration, verified patients identity using patient s name and date of birth. Please see Immunization Activity for additional information.     Screening Questionnaire for Adult Immunization    Are you sick today?   No   Do you have allergies to medications, food, a vaccine component or latex?   No   Have you ever had a serious reaction after receiving a vaccination?   No   Do you have a long-term health problem with heart, lung, kidney, or metabolic disease (e.g., diabetes), asthma, a blood disorder, no spleen, complement component deficiency, a cochlear implant, or a spinal fluid leak?  Are you on long-term aspirin therapy?   Yes   Do you have cancer, leukemia, HIV/AIDS, or any other immune system problem?   No   Do you have a parent, brother, or sister with an immune system problem?   No   In the past 3 months, have you taken medications that affect  your immune system, such as prednisone, other steroids, or anticancer drugs; drugs for the treatment of rheumatoid arthritis, Crohn s disease, or psoriasis; or have you had radiation treatments?   No   Have you had a seizure, or a brain or other nervous system problem?   Yes   During the past year, have you received a transfusion of blood or blood    products, or been given immune (gamma) globulin or antiviral drug?   No   For women: Are you pregnant or is there a chance you could become       pregnant during the next month?   No   Have you received any vaccinations in the past 4 weeks?   No     Immunization questionnaire was positive for at least one answer.  Notified Dr. Olvera.      Patient instructed to remain in clinic for 15 minutes afterwards, and to report any adverse reactions.     Screening performed by Claudia Dickerson CMA on 3/19/2024 at 2:46 PM.

## 2024-03-20 LAB — BACTERIA UR CULT: NORMAL

## 2024-03-24 ASSESSMENT — ENCOUNTER SYMPTOMS
PALPITATIONS: 0
FEVER: 0
ARTHRALGIAS: 0
HEMATURIA: 0
COLOR CHANGE: 0
EYE PAIN: 0
DYSPHORIC MOOD: 1
SHORTNESS OF BREATH: 0
BACK PAIN: 0
NERVOUS/ANXIOUS: 1
CHILLS: 0
ABDOMINAL PAIN: 0
COUGH: 0
SORE THROAT: 0
FREQUENCY: 1
VOMITING: 0
DYSURIA: 0
HEADACHES: 1
SEIZURES: 0

## 2024-05-07 ENCOUNTER — OFFICE VISIT (OUTPATIENT)
Dept: FAMILY MEDICINE | Facility: CLINIC | Age: 64
End: 2024-05-07
Payer: MEDICAID

## 2024-05-07 VITALS
SYSTOLIC BLOOD PRESSURE: 116 MMHG | BODY MASS INDEX: 28.34 KG/M2 | TEMPERATURE: 97.2 F | OXYGEN SATURATION: 95 % | HEIGHT: 62 IN | WEIGHT: 154 LBS | RESPIRATION RATE: 18 BRPM | HEART RATE: 68 BPM | DIASTOLIC BLOOD PRESSURE: 67 MMHG

## 2024-05-07 DIAGNOSIS — R35.0 URINARY FREQUENCY: ICD-10-CM

## 2024-05-07 DIAGNOSIS — R39.9 UTI SYMPTOMS: Primary | ICD-10-CM

## 2024-05-07 DIAGNOSIS — R39.15 URINARY URGENCY: ICD-10-CM

## 2024-05-07 DIAGNOSIS — E78.5 HYPERLIPIDEMIA LDL GOAL <100: ICD-10-CM

## 2024-05-07 LAB
ALBUMIN UR-MCNC: NEGATIVE MG/DL
APPEARANCE UR: CLEAR
BACTERIA #/AREA URNS HPF: ABNORMAL /HPF
BILIRUB UR QL STRIP: NEGATIVE
COLOR UR AUTO: YELLOW
GLUCOSE UR STRIP-MCNC: NEGATIVE MG/DL
HGB UR QL STRIP: ABNORMAL
KETONES UR STRIP-MCNC: NEGATIVE MG/DL
LEUKOCYTE ESTERASE UR QL STRIP: NEGATIVE
NITRATE UR QL: NEGATIVE
PH UR STRIP: 6.5 [PH] (ref 5–8)
RBC #/AREA URNS AUTO: ABNORMAL /HPF
SP GR UR STRIP: 1.02 (ref 1–1.03)
SQUAMOUS #/AREA URNS AUTO: ABNORMAL /LPF
UROBILINOGEN UR STRIP-ACNC: 0.2 E.U./DL
WBC #/AREA URNS AUTO: ABNORMAL /HPF

## 2024-05-07 PROCEDURE — 81001 URINALYSIS AUTO W/SCOPE: CPT | Performed by: FAMILY MEDICINE

## 2024-05-07 PROCEDURE — 87086 URINE CULTURE/COLONY COUNT: CPT | Performed by: FAMILY MEDICINE

## 2024-05-07 PROCEDURE — 99214 OFFICE O/P EST MOD 30 MIN: CPT | Performed by: FAMILY MEDICINE

## 2024-05-07 RX ORDER — QUETIAPINE FUMARATE 200 MG/1
200 TABLET, FILM COATED ORAL AT BEDTIME
COMMUNITY

## 2024-05-07 RX ORDER — SOLIFENACIN SUCCINATE 10 MG/1
10 TABLET, FILM COATED ORAL DAILY
Qty: 30 TABLET | Refills: 3 | Status: SHIPPED | OUTPATIENT
Start: 2024-05-07 | End: 2024-08-12

## 2024-05-07 NOTE — PROGRESS NOTES
1. UTI symptoms  This is a 64 yo female who insists she has UTI symptoms - has invested in multiple OTC UTI medications - mostly AZO products.  She has not had any improvement despite treatment with antibiotics last visit (eventual negative culture).  Will recheck UA/UC at this time - hold on treatment until culture know.    - UA Macroscopic with reflex to Microscopic and Culture; Future  - UA Macroscopic with reflex to Microscopic and Culture  - UA Microscopic with Reflex to Culture  - Urine Culture Aerobic Bacterial; Future  - Urine Culture Aerobic Bacterial    2. Urinary urgency  Patient has urinary urgency  - tried some anticholinergics without imporvement.  Will try Solifenacin.    - solifenacin (VESICARE) 10 MG tablet; Take 1 tablet (10 mg) by mouth daily  Dispense: 30 tablet; Refill: 3  - Adult Urology  Referral; Future  - Urine Culture Aerobic Bacterial; Future  - Urine Culture Aerobic Bacterial    3. Urinary frequency  As above - urinary frequency - check culture.  Refer to Urology.    - Adult Urology  Referral; Future  - Urine Culture Aerobic Bacterial; Future  - Urine Culture Aerobic Bacterial    4. Hyperlipidemia LDL goal <100  Due for lipid screening - discussed.        Subjective   Susi is a 63 year old, presenting for the following health issues:  UTI (Possible uti and frequently urinary )        5/7/2024     2:37 PM   Additional Questions   Roomed by hser   Accompanied by self     Did an Azo test strip for UTI - was confused by results  + leukocytes   Was worried about     Cranberry, osteobiflex, fish oil    Symptoms = frequency -    Psychiatrist is leaving [facility] - needs new psychiatrist -   Wondering about [facility] or  [facility]          History of Present Illness       Reason for visit:  Possible uti, frequently urinary    She eats 2-3 servings of fruits and vegetables daily.She consumes 1 sweetened beverage(s) daily.She exercises with enough effort to  "increase her heart rate 9 or less minutes per day.  She exercises with enough effort to increase her heart rate 3 or less days per week.   She is taking medications regularly.       Review of Systems   Constitutional:  Negative for chills and fever.   HENT:  Negative for ear pain and sore throat.    Eyes:  Negative for pain and visual disturbance.   Respiratory:  Negative for cough and shortness of breath.    Cardiovascular:  Negative for chest pain and palpitations.   Gastrointestinal:  Negative for abdominal pain and vomiting.   Genitourinary:  Positive for dysuria and frequency. Negative for hematuria.   Musculoskeletal:  Negative for arthralgias and back pain.   Skin:  Negative for color change and rash.   Neurological:  Negative for seizures and syncope.   All other systems reviewed and are negative.          Objective    /67 (BP Location: Left arm, Patient Position: Sitting, Cuff Size: Adult Regular)   Pulse 68   Temp 97.2  F (36.2  C) (Temporal)   Resp 18   Ht 1.575 m (5' 2\")   Wt 69.9 kg (154 lb)   LMP  (LMP Unknown)   SpO2 95%   BMI 28.17 kg/m    Body mass index is 28.17 kg/m .  Physical Exam  Vitals reviewed.   Constitutional:       General: She is not in acute distress.     Appearance: Normal appearance.   HENT:      Head: Normocephalic.      Right Ear: Tympanic membrane, ear canal and external ear normal.      Left Ear: Tympanic membrane, ear canal and external ear normal.      Nose: Nose normal.      Mouth/Throat:      Mouth: Mucous membranes are moist.      Pharynx: No posterior oropharyngeal erythema.   Eyes:      Extraocular Movements: Extraocular movements intact.      Conjunctiva/sclera: Conjunctivae normal.      Pupils: Pupils are equal, round, and reactive to light.   Cardiovascular:      Rate and Rhythm: Normal rate and regular rhythm.      Pulses:           Dorsalis pedis pulses are 3+ on the right side and 3+ on the left side.        Posterior tibial pulses are 3+ on the right " side and 3+ on the left side.      Heart sounds: Normal heart sounds. No murmur heard.  Pulmonary:      Effort: Pulmonary effort is normal.      Breath sounds: Normal breath sounds.   Abdominal:      Palpations: Abdomen is soft. There is no mass.      Tenderness: There is no abdominal tenderness. There is no right CVA tenderness, left CVA tenderness, guarding or rebound.   Musculoskeletal:         General: No deformity. Normal range of motion.      Cervical back: Normal range of motion and neck supple.   Feet:      Right foot:      Protective Sensation: 10 sites tested.  10 sites sensed.      Skin integrity: Skin integrity normal.      Toenail Condition: Right toenails are normal.      Left foot:      Protective Sensation: 10 sites tested.  10 sites sensed.      Skin integrity: Skin integrity normal.      Toenail Condition: Left toenails are normal.   Lymphadenopathy:      Cervical: No cervical adenopathy.   Skin:     General: Skin is warm and dry.   Neurological:      General: No focal deficit present.      Mental Status: She is alert.   Psychiatric:         Mood and Affect: Mood normal.         Behavior: Behavior normal.            Results for orders placed or performed in visit on 05/07/24   UA Macroscopic with reflex to Microscopic and Culture     Status: Abnormal    Specimen: Urine, Clean Catch   Result Value Ref Range    Color Urine Yellow Colorless, Straw, Light Yellow, Yellow    Appearance Urine Clear Clear    Glucose Urine Negative Negative mg/dL    Bilirubin Urine Negative Negative    Ketones Urine Negative Negative mg/dL    Specific Gravity Urine 1.020 1.005 - 1.030    Blood Urine Trace (A) Negative    pH Urine 6.5 5.0 - 8.0    Protein Albumin Urine Negative Negative mg/dL    Urobilinogen Urine 0.2 0.2, 1.0 E.U./dL    Nitrite Urine Negative Negative    Leukocyte Esterase Urine Negative Negative   UA Microscopic with Reflex to Culture     Status: Abnormal   Result Value Ref Range    Bacteria Urine None  Seen None Seen /HPF    RBC Urine 0-2 0-2 /HPF /HPF    WBC Urine 0-5 0-5 /HPF /HPF    Squamous Epithelials Urine Few (A) None Seen /LPF    Narrative    Urine Culture not indicated   Urine Culture Aerobic Bacterial     Status: None    Specimen: Urine, Clean Catch   Result Value Ref Range    Culture <10,000 CFU/mL Mixture of Urogenital Gosia            Signed Electronically by: DOV ESPITIA MD        Prior to immunization administration, verified patients identity using patient s name and date of birth. Please see Immunization Activity for additional information.     Screening Questionnaire for Adult Immunization    Are you sick today?   No   Do you have allergies to medications, food, a vaccine component or latex?   Yes   Have you ever had a serious reaction after receiving a vaccination?   No   Do you have a long-term health problem with heart, lung, kidney, or metabolic disease (e.g., diabetes), asthma, a blood disorder, no spleen, complement component deficiency, a cochlear implant, or a spinal fluid leak?  Are you on long-term aspirin therapy?   Yes   Do you have cancer, leukemia, HIV/AIDS, or any other immune system problem?   No   Do you have a parent, brother, or sister with an immune system problem?   No   In the past 3 months, have you taken medications that affect  your immune system, such as prednisone, other steroids, or anticancer drugs; drugs for the treatment of rheumatoid arthritis, Crohn s disease, or psoriasis; or have you had radiation treatments?   No   Have you had a seizure, or a brain or other nervous system problem?   Yes   During the past year, have you received a transfusion of blood or blood    products, or been given immune (gamma) globulin or antiviral drug?   No   For women: Are you pregnant or is there a chance you could become       pregnant during the next month?   No   Have you received any vaccinations in the past 4 weeks?   No     Immunization questionnaire was  positive for at least one answer.  Notified provider.      Patient instructed to remain in clinic for 15 minutes afterwards, and to report any adverse reactions.     Screening performed by Lanre Michaels MA on 5/7/2024 at 2:43 PM.

## 2024-05-09 LAB — BACTERIA UR CULT: NORMAL

## 2024-05-19 ASSESSMENT — ENCOUNTER SYMPTOMS
ABDOMINAL PAIN: 0
PALPITATIONS: 0
DYSURIA: 1
SEIZURES: 0
HEMATURIA: 0
FREQUENCY: 1
COLOR CHANGE: 0
COUGH: 0
SORE THROAT: 0
EYE PAIN: 0
FEVER: 0
BACK PAIN: 0
CHILLS: 0
VOMITING: 0
SHORTNESS OF BREATH: 0
ARTHRALGIAS: 0

## 2024-06-18 DIAGNOSIS — E78.5 HYPERLIPIDEMIA LDL GOAL <100: ICD-10-CM

## 2024-06-18 RX ORDER — ATORVASTATIN CALCIUM 20 MG/1
20 TABLET, FILM COATED ORAL DAILY
Qty: 30 TABLET | Refills: 2 | Status: SHIPPED | OUTPATIENT
Start: 2024-06-18 | End: 2024-09-10

## 2024-07-23 ENCOUNTER — TELEPHONE (OUTPATIENT)
Dept: MAMMOGRAPHY | Facility: CLINIC | Age: 64
End: 2024-07-23
Payer: MEDICAID

## 2024-07-23 NOTE — TELEPHONE ENCOUNTER
Patient Quality Outreach    Patient is due for the following:   Breast Cancer Screening - Mammogram    Next Steps:   Patient declined follow up at this time.    Type of outreach:    Phone, spoke to patient/parent. Patient declined scheduling for this year    Next Steps:  Reach out within 90 days via Phone.    Max number of attempts reached: No. Will try again in 90 days if patient still on fail list.    Questions for provider review:    None           ASPEN Perez  Chart routed to Care Team.

## 2024-08-12 DIAGNOSIS — R39.15 URINARY URGENCY: ICD-10-CM

## 2024-08-12 RX ORDER — SOLIFENACIN SUCCINATE 10 MG/1
10 TABLET, FILM COATED ORAL DAILY
Qty: 30 TABLET | Refills: 3 | Status: SHIPPED | OUTPATIENT
Start: 2024-08-12

## 2024-09-10 DIAGNOSIS — E78.5 HYPERLIPIDEMIA LDL GOAL <100: ICD-10-CM

## 2024-09-10 DIAGNOSIS — Z00.00 HEALTH CARE MAINTENANCE: ICD-10-CM

## 2024-09-10 RX ORDER — ATORVASTATIN CALCIUM 20 MG/1
20 TABLET, FILM COATED ORAL DAILY
Qty: 30 TABLET | Refills: 2 | Status: SHIPPED | OUTPATIENT
Start: 2024-09-10

## 2024-09-10 RX ORDER — ALCOHOL 70.47 ML/100ML
1 GEL TOPICAL DAILY
Qty: 30 TABLET | Refills: 5 | Status: SHIPPED | OUTPATIENT
Start: 2024-09-10

## 2024-09-30 ENCOUNTER — OFFICE VISIT (OUTPATIENT)
Dept: FAMILY MEDICINE | Facility: CLINIC | Age: 64
End: 2024-09-30
Payer: MEDICAID

## 2024-09-30 VITALS
TEMPERATURE: 97.8 F | SYSTOLIC BLOOD PRESSURE: 115 MMHG | DIASTOLIC BLOOD PRESSURE: 7 MMHG | RESPIRATION RATE: 21 BRPM | OXYGEN SATURATION: 97 % | BODY MASS INDEX: 30.12 KG/M2 | HEART RATE: 71 BPM | WEIGHT: 170 LBS | HEIGHT: 63 IN

## 2024-09-30 DIAGNOSIS — R73.03 PREDIABETES: ICD-10-CM

## 2024-09-30 DIAGNOSIS — E78.5 HYPERLIPIDEMIA LDL GOAL <100: ICD-10-CM

## 2024-09-30 DIAGNOSIS — R35.0 URINARY FREQUENCY: ICD-10-CM

## 2024-09-30 DIAGNOSIS — R39.9 UTI SYMPTOMS: Primary | ICD-10-CM

## 2024-09-30 DIAGNOSIS — R39.15 URINARY URGENCY: ICD-10-CM

## 2024-09-30 LAB
ALBUMIN SERPL BCG-MCNC: 4.3 G/DL (ref 3.5–5.2)
ALBUMIN UR-MCNC: NEGATIVE MG/DL
ALP SERPL-CCNC: 107 U/L (ref 40–150)
ALT SERPL W P-5'-P-CCNC: 28 U/L (ref 0–50)
ANION GAP SERPL CALCULATED.3IONS-SCNC: 11 MMOL/L (ref 7–15)
APPEARANCE UR: CLEAR
AST SERPL W P-5'-P-CCNC: 28 U/L (ref 0–45)
BACTERIA #/AREA URNS HPF: ABNORMAL /HPF
BILIRUB SERPL-MCNC: 0.2 MG/DL
BILIRUB UR QL STRIP: NEGATIVE
BUN SERPL-MCNC: 17.1 MG/DL (ref 8–23)
CALCIUM SERPL-MCNC: 9.2 MG/DL (ref 8.8–10.4)
CHLORIDE SERPL-SCNC: 102 MMOL/L (ref 98–107)
CHOLEST SERPL-MCNC: 198 MG/DL
COLOR UR AUTO: YELLOW
CREAT SERPL-MCNC: 0.59 MG/DL (ref 0.51–0.95)
EGFRCR SERPLBLD CKD-EPI 2021: >90 ML/MIN/1.73M2
EST. AVERAGE GLUCOSE BLD GHB EST-MCNC: 120 MG/DL
FASTING STATUS PATIENT QL REPORTED: NO
FASTING STATUS PATIENT QL REPORTED: NO
GLUCOSE SERPL-MCNC: 94 MG/DL (ref 70–99)
GLUCOSE UR STRIP-MCNC: NEGATIVE MG/DL
HBA1C MFR BLD: 5.8 % (ref 0–5.6)
HCO3 SERPL-SCNC: 26 MMOL/L (ref 22–29)
HDLC SERPL-MCNC: 81 MG/DL
HGB UR QL STRIP: NEGATIVE
KETONES UR STRIP-MCNC: NEGATIVE MG/DL
LDLC SERPL CALC-MCNC: 92 MG/DL
LEUKOCYTE ESTERASE UR QL STRIP: NEGATIVE
NITRATE UR QL: NEGATIVE
NONHDLC SERPL-MCNC: 117 MG/DL
PH UR STRIP: 5.5 [PH] (ref 5–8)
POTASSIUM SERPL-SCNC: 4.1 MMOL/L (ref 3.4–5.3)
PROT SERPL-MCNC: 7.3 G/DL (ref 6.4–8.3)
RBC #/AREA URNS AUTO: ABNORMAL /HPF
SODIUM SERPL-SCNC: 139 MMOL/L (ref 135–145)
SP GR UR STRIP: 1.01 (ref 1–1.03)
SQUAMOUS #/AREA URNS AUTO: ABNORMAL /LPF
TRIGL SERPL-MCNC: 127 MG/DL
UROBILINOGEN UR STRIP-ACNC: 0.2 E.U./DL
WBC #/AREA URNS AUTO: ABNORMAL /HPF

## 2024-09-30 PROCEDURE — 99213 OFFICE O/P EST LOW 20 MIN: CPT | Performed by: FAMILY MEDICINE

## 2024-09-30 PROCEDURE — 81001 URINALYSIS AUTO W/SCOPE: CPT | Performed by: FAMILY MEDICINE

## 2024-09-30 PROCEDURE — 80053 COMPREHEN METABOLIC PANEL: CPT | Performed by: FAMILY MEDICINE

## 2024-09-30 PROCEDURE — G2211 COMPLEX E/M VISIT ADD ON: HCPCS | Performed by: FAMILY MEDICINE

## 2024-09-30 PROCEDURE — 36415 COLL VENOUS BLD VENIPUNCTURE: CPT | Performed by: FAMILY MEDICINE

## 2024-09-30 PROCEDURE — 83036 HEMOGLOBIN GLYCOSYLATED A1C: CPT | Performed by: FAMILY MEDICINE

## 2024-09-30 PROCEDURE — 80061 LIPID PANEL: CPT | Performed by: FAMILY MEDICINE

## 2024-09-30 PROCEDURE — 91320 SARSCV2 VAC 30MCG TRS-SUC IM: CPT | Performed by: FAMILY MEDICINE

## 2024-09-30 PROCEDURE — 90480 ADMN SARSCOV2 VAC 1/ONLY CMP: CPT | Performed by: FAMILY MEDICINE

## 2024-09-30 PROCEDURE — 87086 URINE CULTURE/COLONY COUNT: CPT | Performed by: FAMILY MEDICINE

## 2024-09-30 NOTE — PROGRESS NOTES
1. UTI symptoms  This is a 63 yo female who complains of UTI symptoms - she has had similar symptoms multiple times - all with negative cultures.  Doubt UTI at this time - will await culture.    - UA with Microscopic - lab collect; Future  - Urine Culture Aerobic Bacterial - lab collect; Future  - UA with Microscopic - lab collect  - Urine Culture Aerobic Bacterial - lab collect  - Urine Microscopic Exam    2. Urinary urgency  3. Urinary frequency  As above - patient has had significant urinary frequency and urgency - this has been recurrent over the last several months.  Will refer for Urology evaluation (we did try medication - Vesicare - without any improvement.    - Adult Urology  Referral; Future    4. Hyperlipidemia LDL goal <100  Patient on statin therapy - continue - check labs   - Lipid Profile (Chol, Trig, HDL, LDL calc); Future  - Lipid Profile (Chol, Trig, HDL, LDL calc)    5. Prediabetes  H/o prediabetes - will continue to monitor regularly.    - Comprehensive metabolic panel (BMP + Alb, Alk Phos, ALT, AST, Total. Bili, TP); Future  - Hemoglobin A1c; Future  - Comprehensive metabolic panel (BMP + Alb, Alk Phos, ALT, AST, Total. Bili, TP)  - Hemoglobin A1c    The longitudinal plan of care for the diagnosis(es)/condition(s) as documented were addressed during this visit. Due to the added complexity in care, I will continue to support Susi in the subsequent management and with ongoing continuity of care.      Subjective   Susi is a 64 year old, presenting for the following health issues:  UTI        9/30/2024     3:23 PM   Additional Questions   Roomed by M   Accompanied by self     Tried antibiotic - didn't help much   Then, took oxybutynin - didn't change much  Switched to Vesicare - doesn't think it's been helpful  Last 2 weeks seem worse -         History of Present Illness       Reason for visit:  Possible uti  Symptom onset:  1-2 weeks ago  Symptoms include:  Unable to hold  "urine,  Symptom intensity:  Severe  Symptom progression:  Staying the same  Had these symptoms before:  Yes  Has tried/received treatment for these symptoms:  Yes  Previous treatment was successful:  Yes  Prior treatment description:  Antibiotic  What makes it worse:  Na  What makes it better:  Na   She is taking medications regularly.           Review of Systems   Constitutional:  Negative for chills and fever.   HENT:  Negative for ear pain and sore throat.    Eyes:  Negative for pain and visual disturbance.   Respiratory:  Negative for cough and shortness of breath.    Cardiovascular:  Negative for chest pain and palpitations.   Gastrointestinal:  Negative for abdominal pain and vomiting.   Genitourinary:  Positive for frequency and urgency. Negative for dysuria and hematuria.   Musculoskeletal:  Negative for arthralgias and back pain.   Skin:  Negative for color change and rash.   Neurological:  Negative for seizures and syncope.   Psychiatric/Behavioral:  The patient is nervous/anxious.    All other systems reviewed and are negative.          Objective    BP (!) 115/7 (BP Location: Left arm, Patient Position: Sitting, Cuff Size: Adult Large)   Pulse 71   Temp 97.8  F (36.6  C) (Temporal)   Resp 21   Ht 1.6 m (5' 2.99\")   Wt 77.1 kg (170 lb)   LMP  (LMP Unknown)   SpO2 97%   BMI 30.12 kg/m    Body mass index is 30.12 kg/m .  Physical Exam  Vitals reviewed.   Constitutional:       General: She is not in acute distress.     Appearance: Normal appearance.   HENT:      Head: Normocephalic.      Right Ear: Tympanic membrane, ear canal and external ear normal.      Left Ear: Tympanic membrane, ear canal and external ear normal.      Nose: Nose normal.      Mouth/Throat:      Mouth: Mucous membranes are moist.      Pharynx: No posterior oropharyngeal erythema.   Eyes:      Extraocular Movements: Extraocular movements intact.      Conjunctiva/sclera: Conjunctivae normal.      Pupils: Pupils are equal, round, and " reactive to light.   Cardiovascular:      Rate and Rhythm: Normal rate and regular rhythm.      Pulses: Normal pulses.      Heart sounds: Normal heart sounds. No murmur heard.  Pulmonary:      Effort: Pulmonary effort is normal.      Breath sounds: Normal breath sounds.   Abdominal:      Palpations: Abdomen is soft. There is no mass.      Tenderness: There is no abdominal tenderness. There is no guarding or rebound.   Musculoskeletal:         General: No deformity. Normal range of motion.      Cervical back: Normal range of motion and neck supple.   Lymphadenopathy:      Cervical: No cervical adenopathy.   Skin:     General: Skin is warm and dry.   Neurological:      General: No focal deficit present.      Mental Status: She is alert.      Comments: Dystonic movements    Psychiatric:         Mood and Affect: Mood normal.         Behavior: Behavior normal.            Results for orders placed or performed in visit on 09/30/24   UA with Microscopic - lab collect     Status: Normal   Result Value Ref Range    Color Urine Yellow Colorless, Straw, Light Yellow, Yellow    Appearance Urine Clear Clear    Glucose Urine Negative Negative mg/dL    Bilirubin Urine Negative Negative    Ketones Urine Negative Negative mg/dL    Specific Gravity Urine 1.010 1.005 - 1.030    Blood Urine Negative Negative    pH Urine 5.5 5.0 - 8.0    Protein Albumin Urine Negative Negative mg/dL    Urobilinogen Urine 0.2 0.2, 1.0 E.U./dL    Nitrite Urine Negative Negative    Leukocyte Esterase Urine Negative Negative   Urine Microscopic Exam     Status: Abnormal   Result Value Ref Range    Bacteria Urine Few (A) None Seen /HPF    RBC Urine None Seen 0-2 /HPF /HPF    WBC Urine 0-5 0-5 /HPF /HPF    Squamous Epithelials Urine None Seen None Seen /LPF   Lipid Profile (Chol, Trig, HDL, LDL calc)     Status: None   Result Value Ref Range    Cholesterol 198 <200 mg/dL    Triglycerides 127 <150 mg/dL    Direct Measure HDL 81 >=50 mg/dL    LDL Cholesterol  Calculated 92 <100 mg/dL    Non HDL Cholesterol 117 <130 mg/dL    Patient Fasting > 8hrs? No     Narrative    Cholesterol  Desirable: < 200 mg/dL  Borderline High: 200 - 239 mg/dL  High: >= 240 mg/dL    Triglycerides  Normal: < 150 mg/dL  Borderline High: 150 - 199 mg/dL  High: 200-499 mg/dL  Very High: >= 500 mg/dL    Direct Measure HDL  Female: >= 50 mg/dL   Male: >= 40 mg/dL    LDL Cholesterol  Desirable: < 100 mg/dL  Above Desirable: 100 - 129 mg/dL   Borderline High: 130 - 159 mg/dL   High:  160 - 189 mg/dL   Very High: >= 190 mg/dL    Non HDL Cholesterol  Desirable: < 130 mg/dL  Above Desirable: 130 - 159 mg/dL  Borderline High: 160 - 189 mg/dL  High: 190 - 219 mg/dL  Very High: >= 220 mg/dL   Comprehensive metabolic panel (BMP + Alb, Alk Phos, ALT, AST, Total. Bili, TP)     Status: None   Result Value Ref Range    Sodium 139 135 - 145 mmol/L    Potassium 4.1 3.4 - 5.3 mmol/L    Carbon Dioxide (CO2) 26 22 - 29 mmol/L    Anion Gap 11 7 - 15 mmol/L    Urea Nitrogen 17.1 8.0 - 23.0 mg/dL    Creatinine 0.59 0.51 - 0.95 mg/dL    GFR Estimate >90 >60 mL/min/1.73m2    Calcium 9.2 8.8 - 10.4 mg/dL    Chloride 102 98 - 107 mmol/L    Glucose 94 70 - 99 mg/dL    Alkaline Phosphatase 107 40 - 150 U/L    AST 28 0 - 45 U/L    ALT 28 0 - 50 U/L    Protein Total 7.3 6.4 - 8.3 g/dL    Albumin 4.3 3.5 - 5.2 g/dL    Bilirubin Total 0.2 <=1.2 mg/dL    Patient Fasting > 8hrs? No    Hemoglobin A1c     Status: Abnormal   Result Value Ref Range    Estimated Average Glucose 120 (H) <117 mg/dL    Hemoglobin A1C 5.8 (H) 0.0 - 5.6 %   Urine Culture Aerobic Bacterial - lab collect     Status: None    Specimen: Urine, Midstream   Result Value Ref Range    Culture <10,000 CFU/mL Mixture of Urogenital Gosia            Signed Electronically by: DOV ESPITIA MD      Prior to immunization administration, verified patients identity using patient s name and date of birth. Please see Immunization Activity for additional  information.     Screening Questionnaire for Adult Immunization    Are you sick today?   No   Do you have allergies to medications, food, a vaccine component or latex?   Yes   Have you ever had a serious reaction after receiving a vaccination?   No   Do you have a long-term health problem with heart, lung, kidney, or metabolic disease (e.g., diabetes), asthma, a blood disorder, no spleen, complement component deficiency, a cochlear implant, or a spinal fluid leak?  Are you on long-term aspirin therapy?   No   Do you have cancer, leukemia, HIV/AIDS, or any other immune system problem?   No   Do you have a parent, brother, or sister with an immune system problem?   No   In the past 3 months, have you taken medications that affect  your immune system, such as prednisone, other steroids, or anticancer drugs; drugs for the treatment of rheumatoid arthritis, Crohn s disease, or psoriasis; or have you had radiation treatments?   No   Have you had a seizure, or a brain or other nervous system problem?   No   During the past year, have you received a transfusion of blood or blood    products, or been given immune (gamma) globulin or antiviral drug?   No   For women: Are you pregnant or is there a chance you could become       pregnant during the next month?   No   Have you received any vaccinations in the past 4 weeks?   No     Immunization questionnaire was positive for at least one answer.  Notified .      Patient instructed to remain in clinic for 15 minutes afterwards, and to report any adverse reactions.     Screening performed by TANVI De Dios MA on 9/30/2024 at 3:27 PM.

## 2024-10-02 LAB — BACTERIA UR CULT: NORMAL

## 2024-10-06 ASSESSMENT — ENCOUNTER SYMPTOMS
SEIZURES: 0
FEVER: 0
NERVOUS/ANXIOUS: 1
SHORTNESS OF BREATH: 0
PALPITATIONS: 0
COLOR CHANGE: 0
HEMATURIA: 0
EYE PAIN: 0
VOMITING: 0
BACK PAIN: 0
COUGH: 0
FREQUENCY: 1
ABDOMINAL PAIN: 0
ARTHRALGIAS: 0
CHILLS: 0
DYSURIA: 0
SORE THROAT: 0

## 2024-12-03 DIAGNOSIS — E78.5 HYPERLIPIDEMIA LDL GOAL <100: ICD-10-CM

## 2024-12-03 DIAGNOSIS — R39.15 URINARY URGENCY: ICD-10-CM

## 2024-12-03 RX ORDER — ATORVASTATIN CALCIUM 20 MG/1
20 TABLET, FILM COATED ORAL DAILY
Qty: 30 TABLET | Refills: 2 | Status: SHIPPED | OUTPATIENT
Start: 2024-12-03

## 2024-12-03 RX ORDER — SOLIFENACIN SUCCINATE 10 MG/1
10 TABLET, FILM COATED ORAL DAILY
Qty: 30 TABLET | Refills: 3 | Status: SHIPPED | OUTPATIENT
Start: 2024-12-03

## 2024-12-08 ENCOUNTER — HEALTH MAINTENANCE LETTER (OUTPATIENT)
Age: 64
End: 2024-12-08

## 2025-01-29 ENCOUNTER — TELEPHONE (OUTPATIENT)
Dept: FAMILY MEDICINE | Facility: CLINIC | Age: 65
End: 2025-01-29
Payer: MEDICAID

## 2025-01-29 NOTE — TELEPHONE ENCOUNTER
Patient Quality Outreach    Patient is due for the following:   Breast Cancer Screening - Mammogram    Action(s) Taken:   Schedule a Adult Preventative    Type of outreach:    Sent "[employer] message.    Questions for provider review:    None           Andrea Behrend

## 2025-02-17 NOTE — TELEPHONE ENCOUNTER
Patient Quality Outreach    Patient is due for the following:   Breast Cancer Screening - Mammogram    Action(s) Taken:   Schedule a Adult Preventative    Type of outreach:    Tried to call patient on her mobile phone but there was no answer and she does not have voicemail.    Questions for provider review:    None           Andrea Behrend, ARRT

## 2025-02-25 DIAGNOSIS — E78.5 HYPERLIPIDEMIA LDL GOAL <100: ICD-10-CM

## 2025-02-25 DIAGNOSIS — Z00.00 HEALTH CARE MAINTENANCE: ICD-10-CM

## 2025-02-25 RX ORDER — ATORVASTATIN CALCIUM 20 MG/1
20 TABLET, FILM COATED ORAL DAILY
Qty: 30 TABLET | Refills: 5 | Status: SHIPPED | OUTPATIENT
Start: 2025-02-25

## 2025-02-25 RX ORDER — ALCOHOL 70.47 ML/100ML
1 GEL TOPICAL DAILY
Qty: 30 TABLET | Refills: 5 | Status: SHIPPED | OUTPATIENT
Start: 2025-02-25

## 2025-02-27 ENCOUNTER — PATIENT OUTREACH (OUTPATIENT)
Dept: CARE COORDINATION | Facility: CLINIC | Age: 65
End: 2025-02-27
Payer: MEDICAID

## 2025-02-27 NOTE — PROGRESS NOTES
Clinic Care Coordination Contact  Care Team Conversations    Request from provider for outreach to support and assess for [facility] - food insecurities  Rn CC will open program and outreach to pt within one week

## 2025-03-25 DIAGNOSIS — R39.15 URINARY URGENCY: ICD-10-CM

## 2025-03-25 RX ORDER — SOLIFENACIN SUCCINATE 10 MG/1
10 TABLET, FILM COATED ORAL DAILY
Qty: 30 TABLET | Refills: 5 | Status: SHIPPED | OUTPATIENT
Start: 2025-03-25

## 2025-04-07 ENCOUNTER — ANCILLARY ORDERS (OUTPATIENT)
Dept: FAMILY MEDICINE | Facility: CLINIC | Age: 65
End: 2025-04-07

## 2025-04-07 ENCOUNTER — OFFICE VISIT (OUTPATIENT)
Dept: FAMILY MEDICINE | Facility: CLINIC | Age: 65
End: 2025-04-07
Payer: MEDICAID

## 2025-04-07 ENCOUNTER — ANCILLARY PROCEDURE (OUTPATIENT)
Dept: MAMMOGRAPHY | Facility: CLINIC | Age: 65
End: 2025-04-07
Payer: MEDICAID

## 2025-04-07 VITALS
HEART RATE: 87 BPM | OXYGEN SATURATION: 97 % | HEIGHT: 62 IN | DIASTOLIC BLOOD PRESSURE: 74 MMHG | RESPIRATION RATE: 15 BRPM | SYSTOLIC BLOOD PRESSURE: 120 MMHG | WEIGHT: 185 LBS | BODY MASS INDEX: 34.04 KG/M2 | TEMPERATURE: 98.1 F

## 2025-04-07 DIAGNOSIS — E11.69 TYPE 2 DIABETES MELLITUS WITH OTHER SPECIFIED COMPLICATION, WITHOUT LONG-TERM CURRENT USE OF INSULIN (H): Primary | ICD-10-CM

## 2025-04-07 DIAGNOSIS — Z23 NEED FOR PNEUMOCOCCAL 20-VALENT CONJUGATE VACCINATION: ICD-10-CM

## 2025-04-07 DIAGNOSIS — Z12.31 VISIT FOR SCREENING MAMMOGRAM: Primary | ICD-10-CM

## 2025-04-07 DIAGNOSIS — Z12.31 VISIT FOR SCREENING MAMMOGRAM: ICD-10-CM

## 2025-04-07 DIAGNOSIS — M65.30 TRIGGER FINGER, ACQUIRED: ICD-10-CM

## 2025-04-07 DIAGNOSIS — R35.0 URINARY FREQUENCY: ICD-10-CM

## 2025-04-07 DIAGNOSIS — F25.9 SCHIZOAFFECTIVE DISORDER-CHRONIC WITH EXACERBATION (H): ICD-10-CM

## 2025-04-07 DIAGNOSIS — R39.15 URINARY URGENCY: ICD-10-CM

## 2025-04-07 DIAGNOSIS — Z00.00 ENCOUNTER FOR MEDICARE ANNUAL WELLNESS EXAM: ICD-10-CM

## 2025-04-07 LAB
ALBUMIN UR-MCNC: NEGATIVE MG/DL
APPEARANCE UR: CLEAR
BACTERIA #/AREA URNS HPF: ABNORMAL /HPF
BILIRUB UR QL STRIP: NEGATIVE
COLOR UR AUTO: YELLOW
ERYTHROCYTE [DISTWIDTH] IN BLOOD BY AUTOMATED COUNT: 13.2 % (ref 10–15)
EST. AVERAGE GLUCOSE BLD GHB EST-MCNC: 120 MG/DL
GLUCOSE UR STRIP-MCNC: NEGATIVE MG/DL
HBA1C MFR BLD: 5.8 % (ref 0–5.6)
HCT VFR BLD AUTO: 40.3 % (ref 35–47)
HGB BLD-MCNC: 13.2 G/DL (ref 11.7–15.7)
HGB UR QL STRIP: NEGATIVE
KETONES UR STRIP-MCNC: NEGATIVE MG/DL
LEUKOCYTE ESTERASE UR QL STRIP: NEGATIVE
MCH RBC QN AUTO: 30.9 PG (ref 26.5–33)
MCHC RBC AUTO-ENTMCNC: 32.8 G/DL (ref 31.5–36.5)
MCV RBC AUTO: 94 FL (ref 78–100)
NITRATE UR QL: NEGATIVE
PH UR STRIP: 6 [PH] (ref 5–8)
PLATELET # BLD AUTO: 340 10E3/UL (ref 150–450)
RBC # BLD AUTO: 4.27 10E6/UL (ref 3.8–5.2)
RBC #/AREA URNS AUTO: ABNORMAL /HPF
SP GR UR STRIP: 1.01 (ref 1–1.03)
SQUAMOUS #/AREA URNS AUTO: ABNORMAL /LPF
UROBILINOGEN UR STRIP-ACNC: 0.2 E.U./DL
WBC # BLD AUTO: 6.5 10E3/UL (ref 4–11)
WBC #/AREA URNS AUTO: ABNORMAL /HPF

## 2025-04-07 PROCEDURE — 99396 PREV VISIT EST AGE 40-64: CPT | Mod: 25 | Performed by: FAMILY MEDICINE

## 2025-04-07 PROCEDURE — 99213 OFFICE O/P EST LOW 20 MIN: CPT | Mod: 25 | Performed by: FAMILY MEDICINE

## 2025-04-07 PROCEDURE — 77067 SCR MAMMO BI INCL CAD: CPT | Mod: TC | Performed by: RADIOLOGY

## 2025-04-07 PROCEDURE — 77063 BREAST TOMOSYNTHESIS BI: CPT | Mod: TC | Performed by: RADIOLOGY

## 2025-04-07 PROCEDURE — 90471 IMMUNIZATION ADMIN: CPT | Performed by: FAMILY MEDICINE

## 2025-04-07 PROCEDURE — 87086 URINE CULTURE/COLONY COUNT: CPT | Performed by: FAMILY MEDICINE

## 2025-04-07 PROCEDURE — 90677 PCV20 VACCINE IM: CPT | Performed by: FAMILY MEDICINE

## 2025-04-07 PROCEDURE — 81001 URINALYSIS AUTO W/SCOPE: CPT | Performed by: FAMILY MEDICINE

## 2025-04-07 PROCEDURE — 83036 HEMOGLOBIN GLYCOSYLATED A1C: CPT | Performed by: FAMILY MEDICINE

## 2025-04-07 PROCEDURE — 3078F DIAST BP <80 MM HG: CPT | Performed by: FAMILY MEDICINE

## 2025-04-07 PROCEDURE — 80061 LIPID PANEL: CPT | Performed by: FAMILY MEDICINE

## 2025-04-07 PROCEDURE — 80053 COMPREHEN METABOLIC PANEL: CPT | Performed by: FAMILY MEDICINE

## 2025-04-07 PROCEDURE — 85027 COMPLETE CBC AUTOMATED: CPT | Performed by: FAMILY MEDICINE

## 2025-04-07 PROCEDURE — 36415 COLL VENOUS BLD VENIPUNCTURE: CPT | Performed by: FAMILY MEDICINE

## 2025-04-07 PROCEDURE — G2211 COMPLEX E/M VISIT ADD ON: HCPCS | Performed by: FAMILY MEDICINE

## 2025-04-07 PROCEDURE — 3074F SYST BP LT 130 MM HG: CPT | Performed by: FAMILY MEDICINE

## 2025-04-07 SDOH — HEALTH STABILITY: PHYSICAL HEALTH: ON AVERAGE, HOW MANY DAYS PER WEEK DO YOU ENGAGE IN MODERATE TO STRENUOUS EXERCISE (LIKE A BRISK WALK)?: 4 DAYS

## 2025-04-07 ASSESSMENT — PATIENT HEALTH QUESTIONNAIRE - PHQ9
SUM OF ALL RESPONSES TO PHQ QUESTIONS 1-9: 16
SUM OF ALL RESPONSES TO PHQ QUESTIONS 1-9: 16
10. IF YOU CHECKED OFF ANY PROBLEMS, HOW DIFFICULT HAVE THESE PROBLEMS MADE IT FOR YOU TO DO YOUR WORK, TAKE CARE OF THINGS AT HOME, OR GET ALONG WITH OTHER PEOPLE: SOMEWHAT DIFFICULT

## 2025-04-07 ASSESSMENT — SOCIAL DETERMINANTS OF HEALTH (SDOH): HOW OFTEN DO YOU GET TOGETHER WITH FRIENDS OR RELATIVES?: PATIENT DECLINED
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
## 2025-04-08 LAB
ALBUMIN SERPL BCG-MCNC: 4.6 G/DL (ref 3.5–5.2)
ALP SERPL-CCNC: 112 U/L (ref 40–150)
ALT SERPL W P-5'-P-CCNC: 31 U/L (ref 0–50)
ANION GAP SERPL CALCULATED.3IONS-SCNC: 13 MMOL/L (ref 7–15)
AST SERPL W P-5'-P-CCNC: 34 U/L (ref 0–45)
BILIRUB SERPL-MCNC: 0.2 MG/DL
BUN SERPL-MCNC: 16.1 MG/DL (ref 8–23)
CALCIUM SERPL-MCNC: 9.7 MG/DL (ref 8.8–10.4)
CHLORIDE SERPL-SCNC: 100 MMOL/L (ref 98–107)
CHOLEST SERPL-MCNC: 211 MG/DL
CREAT SERPL-MCNC: 0.57 MG/DL (ref 0.51–0.95)
EGFRCR SERPLBLD CKD-EPI 2021: >90 ML/MIN/1.73M2
FASTING STATUS PATIENT QL REPORTED: ABNORMAL
FASTING STATUS PATIENT QL REPORTED: NORMAL
GLUCOSE SERPL-MCNC: 93 MG/DL (ref 70–99)
HCO3 SERPL-SCNC: 25 MMOL/L (ref 22–29)
HDLC SERPL-MCNC: 75 MG/DL
LDLC SERPL CALC-MCNC: 109 MG/DL
NONHDLC SERPL-MCNC: 136 MG/DL
POTASSIUM SERPL-SCNC: 4.5 MMOL/L (ref 3.4–5.3)
PROT SERPL-MCNC: 7.9 G/DL (ref 6.4–8.3)
SODIUM SERPL-SCNC: 138 MMOL/L (ref 135–145)
TRIGL SERPL-MCNC: 135 MG/DL

## 2025-04-09 LAB — BACTERIA UR CULT: NORMAL

## 2025-04-13 ASSESSMENT — ENCOUNTER SYMPTOMS
DYSURIA: 0
ARTHRALGIAS: 1
FEVER: 0
CHILLS: 0
COLOR CHANGE: 0
BACK PAIN: 0
SHORTNESS OF BREATH: 0
EYE PAIN: 0
SORE THROAT: 0
PALPITATIONS: 0
SEIZURES: 0
VOMITING: 0
ABDOMINAL PAIN: 0
COUGH: 0
HEMATURIA: 0

## 2025-04-16 ENCOUNTER — TELEPHONE (OUTPATIENT)
Dept: FAMILY MEDICINE | Facility: CLINIC | Age: 65
End: 2025-04-16
Payer: MEDICAID

## 2025-04-16 NOTE — TELEPHONE ENCOUNTER
Test Results        Who ordered the test:  PCP    Type of test: Lab    Date of test:  04/07/25    Where was the test performed:  [address] Location    What are your questions/concerns?:  pt would like results for A1C and UTI, please give her a call back     Could we send this information to you in [place] or would you prefer to receive a phone call?:   Patient would prefer a phone call   Okay to leave a detailed message?: No at Cell number on file:    Telephone Information:   Mobile [phone]   Mobile Not on file.

## 2025-04-17 ENCOUNTER — OFFICE VISIT (OUTPATIENT)
Dept: ORTHOPEDICS | Facility: CLINIC | Age: 65
End: 2025-04-17
Attending: FAMILY MEDICINE
Payer: MEDICAID

## 2025-04-17 VITALS — BODY MASS INDEX: 33.49 KG/M2 | WEIGHT: 182 LBS | HEIGHT: 62 IN

## 2025-04-17 DIAGNOSIS — M65.332 TRIGGER FINGER, LEFT MIDDLE FINGER: Primary | ICD-10-CM

## 2025-04-17 RX ORDER — LIDOCAINE HYDROCHLORIDE 10 MG/ML
0.5 INJECTION, SOLUTION INFILTRATION; PERINEURAL
Status: COMPLETED | OUTPATIENT
Start: 2025-04-17 | End: 2025-04-17

## 2025-04-17 RX ORDER — TRIAMCINOLONE ACETONIDE 40 MG/ML
20 INJECTION, SUSPENSION INTRA-ARTICULAR; INTRAMUSCULAR
Status: COMPLETED | OUTPATIENT
Start: 2025-04-17 | End: 2025-04-17

## 2025-04-17 RX ADMIN — LIDOCAINE HYDROCHLORIDE 0.5 ML: 10 INJECTION, SOLUTION INFILTRATION; PERINEURAL at 15:10

## 2025-04-17 RX ADMIN — TRIAMCINOLONE ACETONIDE 20 MG: 40 INJECTION, SUSPENSION INTRA-ARTICULAR; INTRAMUSCULAR at 15:10

## 2025-04-17 NOTE — PATIENT INSTRUCTIONS
Susi Haro, It was nice to see you in our office today.      DIAGNOSIS:   1. Trigger finger, acquired      INSTRUCTIONS FOR FOLLOW-UP CARE:  Activity Modification: As tolerated  Rehabilitation: Hand therapy recommended, referral placed  Interventions: Today you had an landmark-guided left middle finger flexor tendon sheath corticosteroid injection       POST-INJECTION INSTRUCTIONS:  No increased activity the day of the injection, but it is okay to perform typical daily activities. Gradually increase your activities after 24 hours as tolerated.  Do not submerge the area in water (ie. No tub baths, pool, lake, hot tubs...etc) for 48 hours, but you can take a shower  You may have a mild to moderate increase in pain for a few days up to a week following the injection.  The lidocaine (numbing medicine) will wear off in several hours. It usually takes 3-5 days for the steroid medication to start working, although it may take up to 14 days for full effect.   You may use ice packs for 10-15 minutes, 3 to 4 times a day at the injection site for comfort if needed.  Be sure to put a thin cloth between your skin and the ice to avoid any skin irritation  You may take your normal over the counter pain medications by mouth if needed for pain    If you were fasting, you may resume your normal diet and medications after the procedure  If you have diabetes, your blood sugar may be higher than normal for 10-14 days following a steroid injection. Contact your doctor who manages your diabetes if your blood sugar is significantly higher than usual  If you experience any of the following signs of infection, suspect you may be having a reaction to the medication, or have any questions, call our office ([facility]) at [phone] during regular business hours.  -Fever over 100 degrees F  -Swelling, redness, drainage, increased pain, bleeding, warmth at the injection site  -New or significant worsening pain  If you  are experiencing any serious symptoms, call 911 or go to the emergency room.   Follow-up Plan: 2 weeks        CLINIC LOCATIONS:     [place] [facility]:  [phone]    [address], [address] [facility] HELP LINE: [phone]   [place] [address] TRIAGE LINE: [phone]   (Monday & Friday) APPOINTMENTS: [phone]    RADIOLOGY: [phone]   [facility] [phone] [address] PHYSICAL & OCCUPATIONAL THERAPY: [phone]*   [place] [address] BILLING QUESTIONS: [phone]   (Tuesday & Thursday) FAX: [phone]       *Therapy locations that offer Saturday options: [place], [place], [place]    Thank you for choosing [facility]!    If you have any questions, please do not hesitate to reach out on [employer] or Call [phone] and ask for my team.    Sharla Bowen MD, CAM  [facility]

## 2025-04-17 NOTE — LETTER
4/17/2025      Susi Haro  [address]  [place] [address]      Dear Colleague,    Thank you for referring your patient, Susi Haro, to the [facility] [place]. Please see a copy of my visit note below.    SPORTS MEDICINE CLINIC NEW PATIENT VISIT    REFERRAL SOURCE: Cara Cobos MD    PATIENT'S GOAL FOR APPOINTMENT: cortisone injection     HISTORY OF PRESENT ILLNESS  Susi Haro is a 64 year old Right-handed female with HLD and prediabetes presenting as a new patient with left wrist/hand pain    When did problem start?/Trauma associated with onset?:  Onset: 3-6 months   No JOSEFINA     Location & description of pain:  Left middle finger     Exacerbating factors:   Gripping, lifting     Remitting factors: rest    Previous Treatments:  -Medications: None   -Rehabilitation: None   -Durable Medical Equipment: None   -Injections: None   -Modalities: Chiro   -Other Providers seen: PCP     Sports, Hobbies, Employment:  Disabled     Average hours of sleep per night: 6.5-10 hours     Average minutes of exercise per day: 1 hour of walking     Area of Problem  4/17/2025  Date 2 Date 3 Date 4 Date 5   Function Ability in last week - % of Baseline (0 is worst & 100 is best)        Sport/Activity Ability in last week - % of Baseline (0 is worst & 100 is best)        Pain Level in the last week (0 is best & 10 is worst) 0         Additional Information of consideration:  -Neck Pain?:   -Numbness/paresthesias in extremities?:   -Weakness?:    MEDICATIONS    Current Outpatient Medications:      atorvastatin (LIPITOR) 20 MG tablet, TAKE 1 TABLET BY MOUTH DAILY, Disp: 30 tablet, Rfl: 5     Black Cohosh-SoyIsoflav-Magnol (ESTROVEN MENOPAUSE RELIEF) CAPS, Take 1 capsule by mouth daily, Disp: 90 capsule, Rfl: 1     citalopram (CELEXA) 10 MG tablet, Take 10 mg by mouth daily, Disp: , Rfl:      Cranberry 500 MG TABS, Take 500 mg by mouth daily, Disp: 90 tablet, Rfl:  3     deutetrabenazine (AUSTEDO) 6 MG tablet, 12 mg qam, 6 mg at bedtime, Disp: 90 tablet, Rfl: 0     melatonin 3 MG tablet, Take 1 tablet (3 mg) by mouth At Bedtime, Disp: 30 tablet, Rfl: 11     metFORMIN (GLUCOPHAGE XR) 500 MG 24 hr tablet, Take 1 tablet (500 mg) by mouth daily before breakfast, Disp: 30 tablet, Rfl: 11     Misc Natural Products (OSTEO BI-FLEX ADV TRIPLE ST) TABS, Take 1,200 mg by mouth 2 times daily, Disp: 180 tablet, Rfl: 3     Multiple Vitamins-Minerals (PRESERVISION AREDS 2) CAPS, Take 1 capsule by mouth 2 times daily, Disp: 180 capsule, Rfl: 3     multivitamin (THERMEMS) TABS, TAKE 1 TABLET BY MOUTH DAILY, Disp: 30 tablet, Rfl: 5     Omega-3 Fatty Acids (FISH OIL) 1200 MG capsule, Take 1 capsule (1,200 mg) by mouth daily, Disp: 180 capsule, Rfl: 3     polyethylene glycol (MIRALAX) 17 GM/Dose powder, Take 17 g by mouth, Disp: , Rfl:      Pumpkin Seed-Soy Germ (AZO BLADDER CONTROL/GO-LESS PO), Take 1 tablet by mouth daily, Disp: , Rfl:      QUEtiapine (SEROQUEL) 200 MG tablet, Take 200 mg by mouth at bedtime, Disp: , Rfl:      solifenacin (VESICARE) 10 MG tablet, TAKE 1 TABLET BY MOUTH DAILY, Disp: 30 tablet, Rfl: 5     Specialty Vitamins Products (MENOPAUSE SUPPORT PO), Take 1 capsule by mouth daily, Disp: , Rfl:      UNABLE TO FIND, Take 1 tablet by mouth at bedtime MEDICATION NAME: Alteril (sleep aid) - melatonin 5 mg, valerian, L-tryptophan, Disp: , Rfl:      UNABLE TO FIND, Take 15 mLs by mouth nightly as needed (insomnia) MEDICATION NAME: EZ nite sleep aid (diphenhydramine 50mg/30 ml), Disp: , Rfl:     ALLERGIES  Allergies   Allergen Reactions     Melon      Ragweeds      sneezing     Tilactase      Trazodone Other (See Comments)     heart burn  heart burn, PN: heart burn         PAST MEDICAL HISTORY  Past Medical History:   Diagnosis Date     Failed bunionectomy        PAST SURGICAL HISTORY  Past Surgical History:   Procedure Laterality Date     BUNIONECTOMY Left July 8, 2014    [facility] -        SOCIAL HISTORY  Social History     Tobacco Use     Smoking status: Never     Smokeless tobacco: Never   Vaping Use     Vaping status: Never Used       FAMILY HISTORY  Family History   Problem Relation Age of Onset     Breast Cancer Mother          age 72, unsure of bilateral or unilateral     Bipolar Disorder Mother         and niece     Diabetes Father      Pulmonary Embolism Father      Diabetes Paternal Grandmother      Diabetes Paternal Grandfather      Hypertension Brother      Sleep Apnea Brother      Breast Cancer Paternal Cousin         in her 40's, unsure if bilateral or unilateral     Ovarian Cancer No family hx of      Colon Cancer No family hx of        REVIEW OF SYSTEMS  Complete 12 system Review of Systems performed and was negative except for HPI.    VITALS  Vitals:    25 1353   Weight: 82.6 kg (182 lb)   Height: 1.575 m (5' 2\")     PHYSICAL EXAMINATION   General: Age appropriate appearing, no acute distress  HEENT: normocephalic, atraumatic, sclera non-icteric  Skin: No open skin lesion noted in visible areas.  Respiratory: Non labored breathing, No wheezes.  Cardiac/Vessels: No edema, cyanosis, clubbing noted in all extremities.  Lymph: No palpable lymph node swelling noted around the affected area.  Mental: There was no signs of aberrant behaviors noted. Patient was pleasant throughout the encounter.    Functional Movements: Non-antalgic gait appreciated.      LEFT WRIST/HAND:   Inspection: No discoloration noted around the wrist and hand   Palpation: 3rd digit TTP at level of A1 pulley, palpable nodule of flexor tendon  Range of Motion 3rd digit triggering with flexion and extension    IMPRESSION  Susi Haro is a very pleasant 64 year old right-hand-dominant female with HLD and prediabetes who is presenting today with left 3rd digit triggering with associated tenderness at the level of the A1 pulley consistent with stenosing tenosynovitis at the A1 pulley.  "     PLAN  The following was discussed with the patient:  Activity Modification: Activity as tolerated  Imaging/Tests: None indicated at this time  Rehabilitation: Hand therapy recommended, referral placed  Interventions: landmark-guided left 3rd finger flexor tendon sheath CSI was performed at today's visit.  See procedure note below for further details  Follow-up Plan: 2 weeks  Resources Provided: Written and verbal information detailing above findings and plan provided including after visit summary.    SPORTS MEDICINE CLINIC PROCEDURE NOTE    Procedure: [place]-guided left  3rd finger flexor tendon sheath corticosteroid injection    Medications and allergies were reviewed with the patient. No contraindications were identified.    Informed Consent  Following denial of allergy and review of potential side effects and complications including but not necessarily limited to infection, allergic reaction, local tissue breakdown, systemic effects of corticosteroids, elevation of blood glucose, injury to soft tissue and/or nerves and seizure, the patient indicated understanding and agreed to proceed. Written consent was obtained.    Hand / Upper Extremity Injection/Arthrocentesis: L long A1    Date/Time: 4/17/2025 3:10 PM    Performed by: Sharla Bowen MD  Authorized by: Sharla Bowen MD    Needle Size:  30 G  Guidance: landmark    Condition: trigger finger    Location:  Long finger    Site:  L long A1  Medications:  0.5 mL lidocaine 1 %; 20 mg triamcinolone 40 MG/ML  Outcome:  Tolerated well, no immediate complications  Procedure discussed: discussed risks, benefits, and alternatives    Consent Given by:  Patient  Timeout: timeout called immediately prior to procedure    Prep: patient was prepped and draped in usual sterile fashion      They were encouraged to message me on [employer] whenever they needed.    The patient was in agreement with this plan. All questions were answered to the best of  my ability.    Total time (face-to-face and non-face-to-face) spent on today's visit was 45 minutes. This included preparation for the visit (i.e. reviewing test results), performance of a medically  appropriate history and examination, placing orders for medications, tests or other procedures, and discussing the plan of care with the patient. This time is exclusive of procedures performed and time spent teaching.    Sharla Bowen MD, [facility]  Sports Medicine Attending Physician  Department of Physical Medicine & Rehabilitation          Again, thank you for allowing me to participate in the care of your patient.        Sincerely,        Sharla Bowen MD    Electronically signed

## 2025-04-17 NOTE — PROGRESS NOTES
SPORTS MEDICINE CLINIC NEW PATIENT VISIT    REFERRAL SOURCE: Cara Cobos MD    PATIENT'S GOAL FOR APPOINTMENT: cortisone injection     HISTORY OF PRESENT ILLNESS  Susi Haro is a 64 year old Right-handed female with HLD and prediabetes presenting as a new patient with left wrist/hand pain    When did problem start?/Trauma associated with onset?:  Onset: 3-6 months   No JOSEFINA     Location & description of pain:  Left middle finger     Exacerbating factors:   Gripping, lifting     Remitting factors: rest    Previous Treatments:  -Medications: None   -Rehabilitation: None   -Durable Medical Equipment: None   -Injections: None   -Modalities: Chiro   -Other Providers seen: PCP     Sports, Hobbies, Employment:  Disabled     Average hours of sleep per night: 6.5-10 hours     Average minutes of exercise per day: 1 hour of walking     Area of Problem  4/17/2025  Date 2 Date 3 Date 4 Date 5   Function Ability in last week - % of Baseline (0 is worst & 100 is best)        Sport/Activity Ability in last week - % of Baseline (0 is worst & 100 is best)        Pain Level in the last week (0 is best & 10 is worst) 0         Additional Information of consideration:  -Neck Pain?:   -Numbness/paresthesias in extremities?:   -Weakness?:    MEDICATIONS    Current Outpatient Medications:     atorvastatin (LIPITOR) 20 MG tablet, TAKE 1 TABLET BY MOUTH DAILY, Disp: 30 tablet, Rfl: 5    Black Cohosh-SoyIsoflav-Magnol (ESTROVEN MENOPAUSE RELIEF) CAPS, Take 1 capsule by mouth daily, Disp: 90 capsule, Rfl: 1    citalopram (CELEXA) 10 MG tablet, Take 10 mg by mouth daily, Disp: , Rfl:     Cranberry 500 MG TABS, Take 500 mg by mouth daily, Disp: 90 tablet, Rfl: 3    deutetrabenazine (AUSTEDO) 6 MG tablet, 12 mg qam, 6 mg at bedtime, Disp: 90 tablet, Rfl: 0    melatonin 3 MG tablet, Take 1 tablet (3 mg) by mouth At Bedtime, Disp: 30 tablet, Rfl: 11    metFORMIN (GLUCOPHAGE XR) 500 MG 24 hr tablet, Take 1 tablet (500 mg) by mouth  daily before breakfast, Disp: 30 tablet, Rfl: 11    Misc Natural Products (OSTEO BI-FLEX ADV TRIPLE ST) TABS, Take 1,200 mg by mouth 2 times daily, Disp: 180 tablet, Rfl: 3    Multiple Vitamins-Minerals (PRESERVISION AREDS 2) CAPS, Take 1 capsule by mouth 2 times daily, Disp: 180 capsule, Rfl: 3    multivitamin (THERMEMS) TABS, TAKE 1 TABLET BY MOUTH DAILY, Disp: 30 tablet, Rfl: 5    Omega-3 Fatty Acids (FISH OIL) 1200 MG capsule, Take 1 capsule (1,200 mg) by mouth daily, Disp: 180 capsule, Rfl: 3    polyethylene glycol (MIRALAX) 17 GM/Dose powder, Take 17 g by mouth, Disp: , Rfl:     Pumpkin Seed-Soy Germ (AZO BLADDER CONTROL/GO-LESS PO), Take 1 tablet by mouth daily, Disp: , Rfl:     QUEtiapine (SEROQUEL) 200 MG tablet, Take 200 mg by mouth at bedtime, Disp: , Rfl:     solifenacin (VESICARE) 10 MG tablet, TAKE 1 TABLET BY MOUTH DAILY, Disp: 30 tablet, Rfl: 5    Specialty Vitamins Products (MENOPAUSE SUPPORT PO), Take 1 capsule by mouth daily, Disp: , Rfl:     UNABLE TO FIND, Take 1 tablet by mouth at bedtime MEDICATION NAME: Alteril (sleep aid) - melatonin 5 mg, valerian, L-tryptophan, Disp: , Rfl:     UNABLE TO FIND, Take 15 mLs by mouth nightly as needed (insomnia) MEDICATION NAME: EZ nite sleep aid (diphenhydramine 50mg/30 ml), Disp: , Rfl:     ALLERGIES  Allergies   Allergen Reactions    Melon     Ragweeds      sneezing    Tilactase     Trazodone Other (See Comments)     heart burn  heart burn, PN: heart burn         PAST MEDICAL HISTORY  Past Medical History:   Diagnosis Date    Failed bunionectomy        PAST SURGICAL HISTORY  Past Surgical History:   Procedure Laterality Date    BUNIONECTOMY Left 2014    [facility] -        SOCIAL HISTORY  Social History     Tobacco Use    Smoking status: Never    Smokeless tobacco: Never   Vaping Use    Vaping status: Never Used       FAMILY HISTORY  Family History   Problem Relation Age of Onset    Breast Cancer Mother          age 72, unsure of bilateral  "or unilateral    Bipolar Disorder Mother         and niece    Diabetes Father     Pulmonary Embolism Father     Diabetes Paternal Grandmother     Diabetes Paternal Grandfather     Hypertension Brother     Sleep Apnea Brother     Breast Cancer Paternal Cousin         in her 40's, unsure if bilateral or unilateral    Ovarian Cancer No family hx of     Colon Cancer No family hx of        REVIEW OF SYSTEMS  Complete 12 system Review of Systems performed and was negative except for HPI.    VITALS  Vitals:    04/17/25 1353   Weight: 82.6 kg (182 lb)   Height: 1.575 m (5' 2\")     PHYSICAL EXAMINATION   General: Age appropriate appearing, no acute distress  HEENT: normocephalic, atraumatic, sclera non-icteric  Skin: No open skin lesion noted in visible areas.  Respiratory: Non labored breathing, No wheezes.  Cardiac/Vessels: No edema, cyanosis, clubbing noted in all extremities.  Lymph: No palpable lymph node swelling noted around the affected area.  Mental: There was no signs of aberrant behaviors noted. Patient was pleasant throughout the encounter.    Functional Movements: Non-antalgic gait appreciated.      LEFT WRIST/HAND:   Inspection: No discoloration noted around the wrist and hand   Palpation: 3rd digit TTP at level of A1 pulley, palpable nodule of flexor tendon  Range of Motion 3rd digit triggering with flexion and extension    IMPRESSION  Susi Haro is a very pleasant 64 year old right-hand-dominant female with HLD and prediabetes who is presenting today with left 3rd digit triggering with associated tenderness at the level of the A1 pulley consistent with stenosing tenosynovitis at the A1 pulley.      PLAN  The following was discussed with the patient:  Activity Modification: Activity as tolerated  Imaging/Tests: None indicated at this time  Rehabilitation: Hand therapy recommended, referral placed  Interventions: landmark-guided left 3rd finger flexor tendon sheath CSI was performed at today's visit.  " See procedure note below for further details  Follow-up Plan: 2 weeks  Resources Provided: Written and verbal information detailing above findings and plan provided including after visit summary.    SPORTS MEDICINE CLINIC PROCEDURE NOTE    Procedure: [place]-guided left  3rd finger flexor tendon sheath corticosteroid injection    Medications and allergies were reviewed with the patient. No contraindications were identified.    Informed Consent  Following denial of allergy and review of potential side effects and complications including but not necessarily limited to infection, allergic reaction, local tissue breakdown, systemic effects of corticosteroids, elevation of blood glucose, injury to soft tissue and/or nerves and seizure, the patient indicated understanding and agreed to proceed. Written consent was obtained.    Hand / Upper Extremity Injection/Arthrocentesis: L long A1    Date/Time: 4/17/2025 3:10 PM    Performed by: Sharla Bowen MD  Authorized by: Sharla Bowen MD    Needle Size:  30 G  Guidance: landmark    Condition: trigger finger    Location:  Long finger    Site:  L long A1  Medications:  0.5 mL lidocaine 1 %; 20 mg triamcinolone 40 MG/ML  Outcome:  Tolerated well, no immediate complications  Procedure discussed: discussed risks, benefits, and alternatives    Consent Given by:  Patient  Timeout: timeout called immediately prior to procedure    Prep: patient was prepped and draped in usual sterile fashion      They were encouraged to message me on [employer] whenever they needed.    The patient was in agreement with this plan. All questions were answered to the best of my ability.    Total time (face-to-face and non-face-to-face) spent on today's visit was 45 minutes. This included preparation for the visit (i.e. reviewing test results), performance of a medically  appropriate history and examination, placing orders for medications, tests or other procedures, and discussing  the plan of care with the patient. This time is exclusive of procedures performed and time spent teaching.    Sharla Bowen MD, [facility]  Sports Medicine Attending Physician  Department of Physical Medicine & Rehabilitation

## 2025-04-22 NOTE — TELEPHONE ENCOUNTER
Dr. Olvera - Patient requesting review of 4/7 lab results, provide any recommendations to be relayed to patient.    Iman Vizciano RN  [facility]

## 2025-05-12 ENCOUNTER — TRANSFERRED RECORDS (OUTPATIENT)
Dept: HEALTH INFORMATION MANAGEMENT | Facility: CLINIC | Age: 65
End: 2025-05-12
Payer: MEDICAID

## 2025-05-12 LAB
ALT SERPL-CCNC: 26 IU/L (ref 0–32)
AST SERPL-CCNC: 27 IU/L (ref 0–40)
CREATININE (EXTERNAL): 0.63 MG/DL (ref 0.57–1)
GFR ESTIMATED (EXTERNAL): 100 ML/MIN/1.73
GLUCOSE (EXTERNAL): 97 MG/DL (ref 70–99)
POTASSIUM (EXTERNAL): 4.1 MMOL/L (ref 3.5–5.2)

## 2025-05-16 ENCOUNTER — TELEPHONE (OUTPATIENT)
Dept: FAMILY MEDICINE | Facility: CLINIC | Age: 65
End: 2025-05-16

## 2025-05-16 ENCOUNTER — OFFICE VISIT (OUTPATIENT)
Dept: URGENT CARE | Facility: URGENT CARE | Age: 65
End: 2025-05-16
Payer: MEDICAID

## 2025-05-16 VITALS
TEMPERATURE: 97.4 F | OXYGEN SATURATION: 97 % | HEART RATE: 76 BPM | SYSTOLIC BLOOD PRESSURE: 124 MMHG | DIASTOLIC BLOOD PRESSURE: 78 MMHG | BODY MASS INDEX: 31.09 KG/M2 | WEIGHT: 170 LBS

## 2025-05-16 DIAGNOSIS — R35.0 URINARY FREQUENCY: Primary | ICD-10-CM

## 2025-05-16 LAB
ALBUMIN UR-MCNC: NEGATIVE MG/DL
APPEARANCE UR: CLEAR
BILIRUB UR QL STRIP: NEGATIVE
COLOR UR AUTO: YELLOW
GLUCOSE UR STRIP-MCNC: NEGATIVE MG/DL
HGB UR QL STRIP: NEGATIVE
KETONES UR STRIP-MCNC: ABNORMAL MG/DL
LEUKOCYTE ESTERASE UR QL STRIP: NEGATIVE
NITRATE UR QL: NEGATIVE
PH UR STRIP: 5.5 [PH] (ref 5–7)
SP GR UR STRIP: 1.02 (ref 1–1.03)
UROBILINOGEN UR STRIP-ACNC: 0.2 E.U./DL

## 2025-05-16 PROCEDURE — 3078F DIAST BP <80 MM HG: CPT | Performed by: PHYSICIAN ASSISTANT

## 2025-05-16 PROCEDURE — 81003 URINALYSIS AUTO W/O SCOPE: CPT | Performed by: PHYSICIAN ASSISTANT

## 2025-05-16 PROCEDURE — 99213 OFFICE O/P EST LOW 20 MIN: CPT | Performed by: PHYSICIAN ASSISTANT

## 2025-05-16 PROCEDURE — 3074F SYST BP LT 130 MM HG: CPT | Performed by: PHYSICIAN ASSISTANT

## 2025-05-16 RX ORDER — CEFDINIR 300 MG/1
300 CAPSULE ORAL 2 TIMES DAILY
Qty: 10 CAPSULE | Refills: 0 | Status: SHIPPED | OUTPATIENT
Start: 2025-05-16 | End: 2025-05-21

## 2025-05-16 NOTE — TELEPHONE ENCOUNTER
[facility] from Dr[facility] calling with update.  UA results came back positive for UTI.  During visit with [facility], patient was noted to be confused  [facility] will fax results to [facility] ([facility]ERRY is current)  Dr. Connell is not able to prescribe Abx since they are a psychiatry treatment center  Can PCP prescribe Abx?    Routed to Dr. Olvera to review    Salvatore BARRERA RN  [facility]

## 2025-05-16 NOTE — TELEPHONE ENCOUNTER
"Do we have those results?  If it is \"positive for UTI\" does that mean a urine culture is growing something?  Or just that the urine looks funny?    "

## 2025-05-16 NOTE — PATIENT INSTRUCTIONS
Will treat with short course antibiotics given positive lab at [facility].     Will follow up if worsening or failure to resolve

## 2025-05-16 NOTE — PROGRESS NOTES
Urgent Care Clinic Visit    Chief Complaint   Patient presents with    Urinary Problem     Frequent urination                5/16/2025     4:48 PM   Additional Questions   Roomed by BAL[facility]     Pre-Provider Visit Orders- Urinalysis UA/UC  Patient reports the following symptoms:  frequent urination   Does the patient report any of the following symptoms: vaginal discharge, vaginal itching, possible yeast infection, has a urinary catheter in place, or unable to void in a specimen cup?  No    {OK to proceed with order Link to Pre-Provider Visit Standing Orders SmartSet :[id number]}

## 2025-05-21 ENCOUNTER — PATIENT OUTREACH (OUTPATIENT)
Dept: CARE COORDINATION | Facility: CLINIC | Age: 65
End: 2025-05-21
Payer: MEDICAID

## 2025-05-21 NOTE — TELEPHONE ENCOUNTER
Attempt #1. No answer. LVM to call clinic and ask to speak to a nurse. Please relay Dr. Olvera's message below upon return call.      Salvatore BARRERA RN  [facility]

## 2025-05-21 NOTE — PROGRESS NOTES
Clinic Care Coordination Contact  Follow Up Progress Note      Assessment: RN CC spoke briefly with pt   Pt notes that now is not a good time  RN CC offered to reach out in the next few days  Pt ended call before confirmation        Plan: RN CC will attempt to reach pt again within 1-2 weeks

## 2025-05-21 NOTE — TELEPHONE ENCOUNTER
I don't see any evidence of UTI on the labs that were forwarded to us.  No need for antibiotics based on this urine.

## 2025-05-28 NOTE — TELEPHONE ENCOUNTER
Attempt #2. No answer. LVM to call clinic and ask to speak to a nurse. Please relay Dr. Olvera's message below upon return call.       Salvatore BARRERA RN  [facility]

## 2025-05-29 NOTE — TELEPHONE ENCOUNTER
Patient treated for UTI. See below  No further action needed    Deena Castano RN  [facility]    5/16/2025  [facility] [place]     Efraín Tobias PA-C  Family Medicine     Instructions    Will treat with short course antibiotics given positive lab at [facility].      Will follow up if worsening or failure to resolve         Disp Refills Start End KOBI   cefdinir (OMNICEF) 300 MG capsule 10 capsule 0 5/16/2025 5/21/2025 No   Sig - Route: Take 1 capsule (300 mg) by mouth 2 times daily for 5 days. - Oral

## 2025-06-02 NOTE — PROGRESS NOTES
SUBJECTIVE:  Susi Haro is a 64 year old female who  presents today for a possible UTI. Symptoms of frequency have been going on for 3day(s).  Hematuria no.  still presentand mild.  There is no history of fever, chills, nausea or vomiting.  No history of vaginal or penile discharge. This patient does have a history of urinary tract infections. Patient denies long duration, rigors, flank pain, temperature > 101 degrees F., Vomiting, significant nausea or diarrhea, taking Coumadin, and GFR less than 30 within the last year or vaginal discharge, vaginal odor, vaginal itching, and dyspareunia (pain in labia/pelvis)     Past Medical History:   Diagnosis Date    Failed bunionectomy      Current Outpatient Medications   Medication Sig Dispense Refill    atorvastatin (LIPITOR) 20 MG tablet TAKE 1 TABLET BY MOUTH DAILY 30 tablet 5    Black Cohosh-SoyIsoflav-Magnol (ESTROVEN MENOPAUSE RELIEF) CAPS Take 1 capsule by mouth daily 90 capsule 1    citalopram (CELEXA) 10 MG tablet Take 10 mg by mouth daily      Cranberry 500 MG TABS Take 500 mg by mouth daily 90 tablet 3    deutetrabenazine (AUSTEDO) 6 MG tablet 12 mg qam, 6 mg at bedtime 90 tablet 0    melatonin 3 MG tablet Take 1 tablet (3 mg) by mouth At Bedtime 30 tablet 11    metFORMIN (GLUCOPHAGE XR) 500 MG 24 hr tablet Take 1 tablet (500 mg) by mouth daily before breakfast 30 tablet 11    Misc Natural Products (OSTEO BI-FLEX ADV TRIPLE ST) TABS Take 1,200 mg by mouth 2 times daily 180 tablet 3    Multiple Vitamins-Minerals (PRESERVISION AREDS 2) CAPS Take 1 capsule by mouth 2 times daily 180 capsule 3    multivitamin (THERMEMS) TABS TAKE 1 TABLET BY MOUTH DAILY 30 tablet 5    Omega-3 Fatty Acids (FISH OIL) 1200 MG capsule Take 1 capsule (1,200 mg) by mouth daily 180 capsule 3    polyethylene glycol (MIRALAX) 17 GM/Dose powder Take 17 g by mouth      Pumpkin Seed-Soy Germ (AZO BLADDER CONTROL/GO-LESS PO) Take 1 tablet by mouth daily      QUEtiapine (SEROQUEL) 200 MG  tablet Take 200 mg by mouth at bedtime      solifenacin (VESICARE) 10 MG tablet TAKE 1 TABLET BY MOUTH DAILY 30 tablet 5    Specialty Vitamins Products (MENOPAUSE SUPPORT PO) Take 1 capsule by mouth daily      UNABLE TO FIND Take 1 tablet by mouth at bedtime MEDICATION NAME: Alteril (sleep aid) - melatonin 5 mg, valerian, L-tryptophan      UNABLE TO FIND Take 15 mLs by mouth nightly as needed (insomnia) MEDICATION NAME: EZ nite sleep aid (diphenhydramine 50mg/30 ml)       Social History     Tobacco Use    Smoking status: Never    Smokeless tobacco: Never   Substance Use Topics    Alcohol use: Not on file       ROS:   Review of systems negative except as stated above.    OBJECTIVE:  /78 (BP Location: Right arm, Patient Position: Sitting, Cuff Size: Adult Large)   Pulse 76   Temp 97.4  F (36.3  C) (Temporal)   Wt 77.1 kg (170 lb)   LMP  (LMP Unknown)   SpO2 97%   BMI 31.09 kg/m    GENERAL APPEARANCE: healthy, alert and no distress  RESP: lungs clear to auscultation - no rales, rhonchi or wheezes  CV: regular rates and rhythm, normal S1 S2, no murmur noted  BACK: No CVA tenderness  SKIN: no suspicious lesions or rashes      Results for orders placed or performed in visit on 05/16/25   UA Macroscopic with reflex to Microscopic and Culture - Clinic Collect     Status: Abnormal    Specimen: Urine, Clean Catch   Result Value Ref Range    Color Urine Yellow Colorless, Straw, Light Yellow, Yellow    Appearance Urine Clear Clear    Glucose Urine Negative Negative mg/dL    Bilirubin Urine Negative Negative    Ketones Urine Trace (A) Negative mg/dL    Specific Gravity Urine 1.025 1.003 - 1.035    Blood Urine Negative Negative    pH Urine 5.5 5.0 - 7.0    Protein Albumin Urine Negative Negative mg/dL    Urobilinogen Urine 0.2 0.2, 1.0 E.U./dL    Nitrite Urine Negative Negative    Leukocyte Esterase Urine Negative Negative    Narrative    Microscopic not indicated       ASSESSMENT:   (R35.0) Urinary frequency   (primary encounter diagnosis)  Comment: will treat for bacteria  Plan: UA Macroscopic with reflex to Microscopic and         Culture - Clinic Collect, cefdinir (OMNICEF)         300 MG capsule, UA Macroscopic with reflex to         Microscopic and Culture - Clinic Collect        Red flags and emergent follow up discussed, and understood by patient  Follow up with PCP if symptoms worsen or fail to improve  In 2-3 days

## 2025-06-04 ENCOUNTER — RESULTS FOLLOW-UP (OUTPATIENT)
Dept: FAMILY MEDICINE | Facility: CLINIC | Age: 65
End: 2025-06-04

## 2025-06-04 ENCOUNTER — OFFICE VISIT (OUTPATIENT)
Dept: FAMILY MEDICINE | Facility: CLINIC | Age: 65
End: 2025-06-04
Payer: MEDICAID

## 2025-06-04 VITALS
WEIGHT: 167 LBS | BODY MASS INDEX: 30.54 KG/M2 | TEMPERATURE: 98 F | SYSTOLIC BLOOD PRESSURE: 128 MMHG | DIASTOLIC BLOOD PRESSURE: 83 MMHG | OXYGEN SATURATION: 97 % | RESPIRATION RATE: 18 BRPM | HEART RATE: 71 BPM

## 2025-06-04 DIAGNOSIS — E11.69 TYPE 2 DIABETES MELLITUS WITH OTHER SPECIFIED COMPLICATION, WITHOUT LONG-TERM CURRENT USE OF INSULIN (H): ICD-10-CM

## 2025-06-04 DIAGNOSIS — R35.0 URINARY FREQUENCY: Primary | ICD-10-CM

## 2025-06-04 LAB
ALBUMIN UR-MCNC: NEGATIVE MG/DL
APPEARANCE UR: CLEAR
BACTERIA #/AREA URNS HPF: ABNORMAL /HPF
BILIRUB UR QL STRIP: NEGATIVE
COLOR UR AUTO: YELLOW
GLUCOSE UR STRIP-MCNC: NEGATIVE MG/DL
HGB UR QL STRIP: NEGATIVE
KETONES UR STRIP-MCNC: NEGATIVE MG/DL
LEUKOCYTE ESTERASE UR QL STRIP: NEGATIVE
NITRATE UR QL: NEGATIVE
PH UR STRIP: 6 [PH] (ref 5–8)
RBC #/AREA URNS AUTO: ABNORMAL /HPF
SP GR UR STRIP: 1.01 (ref 1–1.03)
SQUAMOUS #/AREA URNS AUTO: ABNORMAL /LPF
UROBILINOGEN UR STRIP-ACNC: 0.2 E.U./DL
WBC #/AREA URNS AUTO: ABNORMAL /HPF

## 2025-06-04 PROCEDURE — 3074F SYST BP LT 130 MM HG: CPT | Performed by: FAMILY MEDICINE

## 2025-06-04 PROCEDURE — 82570 ASSAY OF URINE CREATININE: CPT | Performed by: FAMILY MEDICINE

## 2025-06-04 PROCEDURE — G2211 COMPLEX E/M VISIT ADD ON: HCPCS | Performed by: FAMILY MEDICINE

## 2025-06-04 PROCEDURE — 99213 OFFICE O/P EST LOW 20 MIN: CPT | Performed by: FAMILY MEDICINE

## 2025-06-04 PROCEDURE — 82043 UR ALBUMIN QUANTITATIVE: CPT | Performed by: FAMILY MEDICINE

## 2025-06-04 PROCEDURE — 3079F DIAST BP 80-89 MM HG: CPT | Performed by: FAMILY MEDICINE

## 2025-06-04 PROCEDURE — 81001 URINALYSIS AUTO W/SCOPE: CPT | Performed by: FAMILY MEDICINE

## 2025-06-04 RX ORDER — TOLTERODINE 4 MG/1
4 CAPSULE, EXTENDED RELEASE ORAL DAILY
Qty: 90 CAPSULE | Refills: 1 | Status: SHIPPED | OUTPATIENT
Start: 2025-06-04
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
## 2025-06-04 NOTE — PATIENT INSTRUCTIONS
Stop Solifenacin  Start Tolterodine 4 mg daily for bladder symptoms  I have referred you to Urology for evaluation.

## 2025-06-05 ENCOUNTER — PATIENT OUTREACH (OUTPATIENT)
Dept: CARE COORDINATION | Facility: CLINIC | Age: 65
End: 2025-06-05
Payer: MEDICAID

## 2025-06-05 LAB
CREAT UR-MCNC: 37.2 MG/DL
MICROALBUMIN UR-MCNC: <12 MG/L
MICROALBUMIN/CREAT UR: NORMAL MG/G{CREAT}

## 2025-06-17 ENCOUNTER — PATIENT OUTREACH (OUTPATIENT)
Dept: CARE COORDINATION | Facility: CLINIC | Age: 65
End: 2025-06-17
Payer: MEDICAID

## 2025-06-17 NOTE — PROGRESS NOTES
Clinic Care Coordination Contact  [facility] [[email or web address]]/[email or web address]    Clinical Data: Care Coordinator Outreach    Outreach Documentation Number of Outreach Attempt   6/17/2025   2:44 PM 1       Left message on patient's voicemail with call back information and requested return call.  Chart review notes pt was seen by PCP and has specility referral place, visit not schedule at this time     Plan: Care Coordinator will try to reach patient again in 1 month.

## 2025-06-17 NOTE — LETTER
[facility]  Patient Centered Plan of Care  About Me:        Patient Name:  Susi Haro    YOB: 1960  Age:         64 year old   [place] MRN:    [id number] Telephone Information:  Home Phone [phone]   Mobile [phone]       Address:  [address] [place] [address] Email address:  [email or web address]      Emergency Contact(s)    Name Relationship Lgl Grd Work Phone Home Phone Mobile Phone   1. GENIE ZAPATA Other   [phone]            Primary language:  English     needed? No   [place] [facility]:  [phone] op. 1  Other communication barriers:No data recorded  Preferred Method of Communication:     Current living arrangement: No data recorded  Mobility Status/ Medical Equipment: No data recorded      Health Maintenance  Health Maintenance Reviewed: Due/Overdue   Health Maintenance Due   Topic    DIABETIC FOOT EXAM     EYE EXAM            My Access Plan  Medical Emergency 911   Primary Clinic Line [place] [phone]   24 Hour Appointment Line [phone] or  [phone]) (toll-free)   24 Hour Nurse Line [phone] (toll-free)   Preferred Urgent Care [facility], [phone]     Preferred Hospital [facility]  [phone]     Preferred Pharmacy [facility] [place] [facility] - [place], [place] - [address]     Behavioral Health Crisis Line The National Suicide Prevention Lifeline at 1-558.457.4304 or Text/Call 308           My Care Team Members  Patient Care Team         Relationship Specialty Notifications Start End    Cara Garcia MD PCP - General   2/16/16     Phone: [phone] Fax: [phone]         [address] [place] [address]    Cara Garcia MD Assigned PCP   6/16/21     Phone: [phone] Fax: [phone]         [address] [place] [address]    Hector Khalil APRN CNP Nurse  Practitioner Urology  9/30/24     Phone: [phone] Fax: [phone]         [address] [place] [address]    Nancy eRy, RN Lead Care Coordinator Primary Care - CC Admissions 2/27/25     Lynn Conway PA-C    3/14/25     Phone: [phone] Fax: [phone] [address] [facility] [address]    Sharla Bowen MD Assigned Neuroscience Provider   4/23/25     Phone: [phone] Fax: [phone]         [address] [place] [address]                My Care Plans  Self Management and Treatment Plan    Care Plan  Care Plan: Medical       Problem: HP GENERAL PROBLEM       Goal: I will develop a plan for urinary urgency within 2-3 months       Start Date: 3/14/2025    Note:     Barriers: access  Strengths: engagement  Patient expressed understanding of goal: yes  Action steps to achieve this goal:  1. I will meet with PCP and discuss options, consider Urology referral if recommended   2. I will update Care coordination team during next outreach                              Action Plans on File:                       Advance Care Plans/Directives:             My Medical and Care Information  Problem List   Patient Active Problem List   Diagnosis    Abnormal finding    Abnormal involuntary movements    Akathisia    Atypical squamous cells of undetermined significance on cytologic smear of cervix (ASC-US)    Cervicalgia    Chronic schizophrenia (H)    Deformity of left thumb joint    Family history of diabetes mellitus    Insomnia    Low back pain    Low grade squamous intraepithelial lesion (LGSIL) on Papanicolaou smear of cervix    Neuroleptic-induced tardive dyskinesia    Onychauxis    Atypical bipolar affective disorder (H)    Intolerance to heat    High risk medication use    Metatarsal fracture    PTSD (post-traumatic stress disorder)    Schizoaffective disorder-chronic with exacerbation (H)    Prediabetes    Hyperlipidemia LDL goal <100    Primary osteoarthritis  involving multiple joints    Menopause    Personal history of urinary tract infection    Type 2 diabetes mellitus with other specified complication, without long-term current use of insulin (H)      Current Medications:  Please refer to the most recent medication list provided to you by your medical team and reach out to your provider with any questions or to make any corrections.    Care Coordination Start Date: 2/27/2025   Frequency of Care Coordination: monthly, more frequently as needed     Form Last Updated: 06/17/2025

## 2025-06-18 ENCOUNTER — TELEPHONE (OUTPATIENT)
Dept: FAMILY MEDICINE | Facility: CLINIC | Age: 65
End: 2025-06-18

## 2025-06-22 ASSESSMENT — ENCOUNTER SYMPTOMS
FREQUENCY: 1
COLOR CHANGE: 0
BACK PAIN: 0
SHORTNESS OF BREATH: 0
HEMATURIA: 0
PALPITATIONS: 0
COUGH: 0
CHILLS: 0
SORE THROAT: 0
ARTHRALGIAS: 0
FEVER: 0
DYSURIA: 1
EYE PAIN: 0
ABDOMINAL PAIN: 0
VOMITING: 0
SEIZURES: 0

## 2025-07-17 ENCOUNTER — PATIENT OUTREACH (OUTPATIENT)
Dept: CARE COORDINATION | Facility: CLINIC | Age: 65
End: 2025-07-17
Payer: MEDICAID

## 2025-07-17 NOTE — PROGRESS NOTES
Clinic Care Coordination Contact  Follow Up Progress Note      Assessment: RN spoke with pt   Pt notes that she is doing better  She did state that she would like to learn more about food resource  RN CC will send message to FRN and follow up in one month    Care Gaps:    Health Maintenance Due   Topic Date Due    EYE EXAM  Never done    DIABETIC FOOT EXAM  05/07/2025    A1C  07/07/2025       Pt did complete her eye exam - update records in chart    Care Plans  Care Plan: Medical Completed 7/17/2025      Problem: HP GENERAL PROBLEM  Resolved 7/17/2025      Goal: I will develop a plan for urinary urgency within 2-3 months  Completed 7/17/2025      Start Date: 3/14/2025    Note:     Barriers: access  Strengths: engagement  Patient expressed understanding of goal: yes  Action steps to achieve this goal:  1. I will meet with PCP and discuss options, consider Urology referral if recommended   2. I will update Care coordination team during next outreach                            Care Plan: Food Insecurity       Problem: Reliable food source       Goal: Establish Options for Affordable Food Sources       Start Date: 7/17/2025    Note:     RN CC will send referral to N for support and follow up in one month                               Intervention/Education provided during outreach: Food resources      Plan: Patient will schedule and attend recommended follow up visits with speciality providers and primary care provider.    RN CC will outreach in 4-6 weeks to support ongoing recommendations and plan of care will be available sooner if needed.

## 2025-07-20 ENCOUNTER — HEALTH MAINTENANCE LETTER (OUTPATIENT)
Age: 65
End: 2025-07-20

## 2025-07-25 ENCOUNTER — PATIENT OUTREACH (OUTPATIENT)
Dept: CARE COORDINATION | Facility: CLINIC | Age: 65
End: 2025-07-25
Payer: MEDICAID

## 2025-07-25 NOTE — LETTER
July 25, 2025      Susi Haro  [address] [facility] [address]  [place] [address]        Dear Susi,     Here are a list of food resources near you. Please let me know if you have any questions or concerns:     Kosher Meals on Wheels  Contact SABINO Sears, Kosher Meals on Wheels Coordinator, [phone].  Frozen kosher meals are delivered Monday, Wednesday and Friday.  Clients can choose from 15 to 20 frozen menu items and place their monthly order.    [place] [facility]/[employer] [place]   Get more fresh food for less!  Triple your SNAP/EBT at your participating farmers markets with [employer] [place]  Everyone should have access to fresh, healthy, local food. [employer] [place] help SNAP customers stretch their dollars at the farmers market, making healthy food more affordable.  [employer] [place] match SNAP-EBT spending dollar-for-dollar (up to $10) at participating farmers markets across [place]. There are 4 easy steps to using SNAP-EBT at the farmers market:  1. Go to the Information Temple at the [employer].  2. Swipe your EBT card for the amount you want to spend. We'll match your purchase dollar-for-dollar, up to $10.  3. Get $10* in free [place] AND $10 in Produce [employer] [place].  4. Shop for up to $20 of SNAP-eligible food and produce at the farmers market.  *Up to $10 per market visit. You do not have to spend the full $10 each time.  Some farmers markets run through the winter months, meaning you can get fresh, healthy, local food all year round!    [facility] [employer]  [facility] (0.63 miles away)  [address] [place] [address], [facility]  Jun 14, 2025-Oct 4, 2025  Participates in the following programs:  SNAP and [employer] [place]  Saturdays 8:00 am-1:00 pm (No July 5)          [facility](1.11 miles away)  [address] [place] [address], [facility]  Our [facility] [employer] [facility] offers an in-person, shop for yourself/household shopping model. No appointment needed - first come first  served during market hours.  Shoppers are welcome to visit this food market 2 times per month - simply visit during shopping hours. First time visitors should plan for an additional 15 minutes for the intake process.  Monday - Friday 9AM-11:30AM & 1-4PM  Every 2nd Saturday 9:30 a.m.-noon on June 14, July 12, and August   Closed all day on:  July 3, July 17, August 7, August 21, September 4, September 18, October 2, and October 16.  [phone]    Mobile Market at [facility](1.61 miles away)  [address] [place] [address]  CLOSED: 1/20, 4/28, 5/26, 6/30, 9/1, 10/13, 12/22, 12/29  PLEASE note- all sales are subject to last minute change. Please check in with us at [employer], [email or web address], or at [phone].  Every Monday at 12:45pm-1:45pm    [facility] Meals on Wheels(2.09 miles away)  [address] [place] [address]  Call to set up delivery  [phone]     completed registration form 07/25/2025**    [address](2.69 miles away)  [address] [place] [address]  Call to set up delivery  [phone]    [facility] Meals on Wheels(3.04 miles away)  [address] [place] [address]  Call to set up delivery   [phone]  ** application submitted 07/25/2025    [place] [facility](2.51 miles away)  [address] [address]  Serves anyone  Tuesdays 10am-3pm  [phone]      Food Shelf In-A-Box at [facility](2.59 miles away)  [address] [place], [place] [address]  Primary contact: [email or web address]  Secondary contact: [email or web address]  2nd Wednesday of the month from 2:00 p.m. - 4:00 p.m.    [facility] Food Shelf(3.45 miles away)  [address] [place] [address], [facility]  The Food Shelf can be found in the basement. To schedule an appointment at our Food Shelf, please register here    Wed: 5PM -  7PM        Sincerely,      Jeni [facility]Kimball [facility] Hospital Food Resource Navigator   Food is Medicine  [facility]   [email or web address]    Electronically signed

## 2025-07-28 NOTE — PROGRESS NOTES
Food Resource Navigator Contact    FRN - Initial Outreach    Reason for visit: Diabetes, mental health    Patient identified as experiencing food insecurity or needing support:  Yes Food insecurity    Food Insecurity: High Risk (4/7/2025)    Food Insecurity     Within the past 12 months, did you worry that your food would run out before you got money to buy more?: Yes     Within the past 12 months, did the food you bought just not last and you didn t have money to get more?: Yes       The patient was provided with the following resources:  Community Food Resources: [place], [facility], [facility]. Food in the [facility]     Patient provided verbal consent for FRN to share contact information with [facility] : Yes  I have discussed the following goals with the patient: Patient will wait to hear back from [employer] and [facility]. Patient will uitilize food shelves when her daughter or family member  gives her rides.     Spent 30 minutes in consult with the patient.     Jeni PierreCritical access hospital [place] Food Resource Navigator   Food is [facility] Hospitalstephanie [email or web address]

## 2025-08-19 ENCOUNTER — PATIENT OUTREACH (OUTPATIENT)
Dept: CARE COORDINATION | Facility: CLINIC | Age: 65
End: 2025-08-19
Payer: MEDICAID

## 2025-08-27 DIAGNOSIS — Z00.00 HEALTH CARE MAINTENANCE: ICD-10-CM

## 2025-08-27 DIAGNOSIS — E78.5 HYPERLIPIDEMIA LDL GOAL <100: ICD-10-CM

## 2025-08-28 RX ORDER — ATORVASTATIN CALCIUM 20 MG/1
20 TABLET, FILM COATED ORAL DAILY
Qty: 30 TABLET | Refills: 5 | Status: SHIPPED | OUTPATIENT
Start: 2025-08-28

## 2025-08-28 RX ORDER — ALCOHOL 70.47 ML/100ML
1 GEL TOPICAL DAILY
Qty: 30 TABLET | Refills: 8 | Status: SHIPPED | OUTPATIENT
Start: 2025-08-28